# Patient Record
Sex: FEMALE | Race: WHITE | NOT HISPANIC OR LATINO | URBAN - METROPOLITAN AREA
[De-identification: names, ages, dates, MRNs, and addresses within clinical notes are randomized per-mention and may not be internally consistent; named-entity substitution may affect disease eponyms.]

---

## 2018-04-17 ENCOUNTER — EMERGENCY (EMERGENCY)
Facility: HOSPITAL | Age: 68
LOS: 1 days | Discharge: HOME | End: 2018-04-17
Attending: EMERGENCY MEDICINE | Admitting: EMERGENCY MEDICINE
Payer: MEDICARE

## 2018-04-17 VITALS
OXYGEN SATURATION: 100 % | RESPIRATION RATE: 16 BRPM | SYSTOLIC BLOOD PRESSURE: 154 MMHG | HEART RATE: 87 BPM | DIASTOLIC BLOOD PRESSURE: 80 MMHG

## 2018-04-17 DIAGNOSIS — F22 DELUSIONAL DISORDERS: ICD-10-CM

## 2018-04-17 DIAGNOSIS — R69 ILLNESS, UNSPECIFIED: ICD-10-CM

## 2018-04-17 PROCEDURE — 90792 PSYCH DIAG EVAL W/MED SRVCS: CPT

## 2018-04-17 PROCEDURE — 99283 EMERGENCY DEPT VISIT LOW MDM: CPT

## 2018-04-17 NOTE — ED BEHAVIORAL HEALTH ASSESSMENT NOTE - CASE SUMMARY
68 year old   female, disabled, non-caregiver, domiciled, PPH dx with Paranoid Schizophrenia, history of 1 past inpatient admission at [facility] 1/18, no history of suicide attempts, aggression, violence, substance abuse issues or PMH.  Patient presents to the ER brought by sons in the context of delusional statements.  Patient endorses some delusion regarding belief that old presidents live at a nearby apartment complex for many years which are chronic, but denies any intent/plan to harm them or have any relations/meet with them.  She denies any other delusions.  She otherwise has a linear, organized thought process and denies any psychotic symptoms.  She is showering, caring for herself and was able to discuss recent shopping list, meals prepared etc.  Patient denies SI/HI/SIB/intent/plan, sylvia/hypomania, depression or any other mental health issues.  Patient is future oriented and able to safety plan.  Patient was offered and refused voluntary inpatient psychiatric admission.  She is not an imminent risk to self or others and is not engaging in dangerous behavior.  Patient does not meet criteria for involuntary inpatient admission as per mental health hygiene law. Recommend discharge and advised follow up with outpatient psychiatrist.

## 2018-04-17 NOTE — ED BEHAVIORAL HEALTH ASSESSMENT NOTE - DESCRIPTION
see hpi During course of ED visit patient was calm and cooperative. Patient was not aggressive or violent and did not require PRN medications. none During course of ED visit patient was calm and cooperative. Patient was not aggressive or violent and did not require PRN medications.    Vital Signs Last 24 Hrs  T(C): --  T(F): --  HR: 87 (17 Apr 2018 13:22) (87 - 87)  BP: 154/80 (17 Apr 2018 13:22) (154/80 - 154/80)  BP(mean): --  RR: 16 (17 Apr 2018 13:22) (16 - 16)  SpO2: 100% (17 Apr 2018 13:22) (100% - 100%)

## 2018-04-17 NOTE — ED PROVIDER NOTE - OBJECTIVE STATEMENT
68F w recent onset psychosis, admitted at [facility], LA'd home but off of meds, only took 1 shot of Abilify, no improvement noted by family. Pt. NAD at time of exam, per sons, has inc. delusions, and dec. po intake from prev. Denies weight loss, no fever/chills, no recent illness, no SI/HI, calm and appropriate at time of eval.

## 2018-04-17 NOTE — ED ADULT TRIAGE NOTE - CHIEF COMPLAINT QUOTE
pt amb to triage w/ family stating presents for arm pain 2/2 washing clothes by hand, as per sons increased confusion, states  to her doctor which shes not, and shes having affair w/ Cristian Jarrett, put telephone in fridge, and believes the "crystals in the TV are watching her," recently harika'd @ [place]  d/c'd because pt was not harm to self or others, sons state "she's not in reality" calm/cooperative on presentation, sons state will remain w/ pt pt amb to triage w/ family stating presents for arm pain 2/2 washing clothes by hand, as per sons increased confusion, states  to her doctor which shes not, and shes having affair w/ Cristian Jarrett, put telephone in fridge, and believes the "crystals in the TV are watching her," recently harika'd @ [place]  d/c'd because pt was not harm to self or others, sons state "she's not in reality" calm/cooperative on presentation, sons state will remain w/ pt  Alexandr [phone]  Wilbur [phone]

## 2018-04-17 NOTE — ED BEHAVIORAL HEALTH ASSESSMENT NOTE - SUMMARY
Patient is a 68 year old  disabled non-caregiver  female currently residing in a private residence alone. PPH dx with Paranoid Schizophrenia during last inpatient admission 1/18. She has a history of 1 past inpatient admission at [facility] 1/18. She has no history of suicide attempts, aggression, violence, substance abuse issues or PMH. BIB sons for delusional statements.    Patient presents to the ER in the context of delusional statements. Patient endorses some delusion regarding belief that old presidents live at a nearby apartment complex for many years but denies any intent/plan to harm them or have any relations/meet with them. She denies any other delusions. She otherwise has a linear organized thought process and denies any psychotic symptoms. She is showering, caring for herself and was able to discuss recent shopping list, meals prepared etc.  Patient denies SI/HI/SIB/intent/plan, sylvia/hypomania, depression or any other mental health issues. Patient is future oriented and able to safety plan.  AOx4. She was offered and refused inpatient psychiatric admission. She is not an imminent risk to self or others and is not engaging in dangerous behavior.  Patient does not meet criteria for involuntary inpatient admission. Recommend discharge and given follow up at [facility] crisis clinic. Extensive safety planning performed. Patient and family agreeing verbally to return patient to ER or call 911 if symptoms worsen or patient has urges to harm self or others.

## 2018-04-17 NOTE — ED BEHAVIORAL HEALTH ASSESSMENT NOTE - RISK ASSESSMENT
Patient does not present an imminent risk to self or others as evidence by no suicidal thoughts plan/intent, no homicidal thoughts, no sylvia/hypomania, no AH/VH, no paranoia /IOR, no depression, eating/sleeping well, showering/changing clothes, shopping and preparing food for self, adequate income, supportive family, no aggression/violence, no legal issues and calm/cooperative throughout evaluation.     risk factors- delusional statement, history of inpatient admission, non compliance with recent treatment

## 2018-04-17 NOTE — ED BEHAVIORAL HEALTH NOTE - BEHAVIORAL HEALTH NOTE
Patient is a 68 year old female brought in to the emergency room by her sons.  Writer called pt's son's Alexandr and Wilbur  who provided the following information.  Patient resides alone.  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  Patient reportedly talked to self at work in the high school and has been collecting social security disability for 20 years.  Pt's son reside in [place].  Patient had her first psychiatric admission to [facility] January 2018 for two weeks. Patient was reportedly diagnosed as paranoid schizophrenia.  She received a long acting injection of Abilify, but has refused any further medication.  They did not see any improvement in patient after receiving NEGRETE, but state [place] informed them she was not a danger to self or others and patient was discharged.  She did attend an intake visit at [facility] in [place], but refused to return for regular follow ups.  Patient has no other psychiatric admissions.  Patients sons were planning to bring patient to the ER at [facility] since her discharge from [facility] stating they were not happy with the outcome of her stay at [facility].    Pt's sons are concerned because patient has a leak in her bathroom faucet she will not allow the superintendent to enter her apartment.  Patient has very little food in the refrigerator and not eating well.  Patient has her phone in the freezer and the TV is unplugged because she states it has crystals.  They state patient is not doing anything dangerous or threatening.  She will not allow a home attendant into the home.  They report she resides on the second floor and were able to hear patient screaming from downstairs when they arrived.  Patient's sons would like her admitted stating their main concern is that she doesn't have enough food in the refrigerator.  Patient does not have any agencies involved.  Pt's sons were informed patient may not require admission.  They inquired about nursing home facility which writer informed them that patient would not qualify for nursing home placement.  They inquired about assisted living facility.  Writer informed them costs associated with assisted living facilities.  Writer discussed case with evaluating psychiatric nurse practitioner and was informed patient was psychiatrically cleared.  Writer called pt's sons to inform them of discharge with recommendation for walk in clinic at [facility].  Pt's son's were in agreement with discharge and will wait for patient in the ER waiting area to transport her home.

## 2018-04-17 NOTE — ED BEHAVIORAL HEALTH ASSESSMENT NOTE - OTHER PAST PSYCHIATRIC HISTORY (INCLUDE DETAILS REGARDING ONSET, COURSE OF ILLNESS, INPATIENT/OUTPATIENT TREATMENT)
PPH dx with Paranoid Schizophrenia during last inpatient admission 1/18. She has a history of 1 past inpatient admission at [facility] 1/18. She has no history of suicide attempts.    Patient on disability x 20 years ago "talking to self"

## 2018-04-17 NOTE — ED ADULT NURSE NOTE - CHIEF COMPLAINT QUOTE
pt amb to triage w/ family stating presents for arm pain 2/2 washing clothes by hand, as per sons increased confusion, states  to her doctor which shes not, and shes having affair w/ Cristian Jarrett, put telephone in fridge, and believes the "crystals in the TV are watching her," recently harika'd @ [place]  d/c'd because pt was not harm to self or others, sons state "she's not in reality" calm/cooperative on presentation, sons state will remain w/ pt  Alexandr [phone]  Wilbur [phone]

## 2018-04-17 NOTE — ED BEHAVIORAL HEALTH ASSESSMENT NOTE - HPI (INCLUDE ILLNESS QUALITY, SEVERITY, DURATION, TIMING, CONTEXT, MODIFYING FACTORS, ASSOCIATED SIGNS AND SYMPTOMS)
Patient is a 68 year old  disabled non-caregiver  female currently residing in a private residence alone. PPH dx with Paranoid Schizophrenia during last inpatient admission 1/18. She has a history of 1 past inpatient admission at [facility] 1/18. She has no history of suicide attempts, aggression, violence, substance abuse issues or PMH. BIB sons for delusional statements.    Patient presents to the ER in the context of sons stating she is making delusional statements. Patient stated she does not know why they brought her here. She said they showed up and were insistent she go to lunch. She did not want to go because she said she was tired and her sheets were drying from laundry. They took her purse so this left her no choice but to go with them and they brought her to the ER.    Patient reports she is doing fine. She stated she eats everyday and has plenty of food in the apartment. She discussed recent shopping trip to [facility] [employer] and food she bought and said she prepares her meals everyday. She discussed what she ate today and yesterday. She stated she unplugs her TV because it does not work and the picture is fuzzy. She did make a delusional statement saying that there is a nearby apartment where past presidents live such as Sandee Pereyra and currently sometimes Cristian Farias. She stated she has believed this for years. She denies any thoughts plan/intent to meet with these individuals and denies HI. She denies SI/HI/SIB/intent/plan, depression, AH/VH, paranoia, IOR, anxiety, drug use, sylvia/hypomania or any other mental michael issues. She was AOx4. She stated she showers daily and changes her clothes. She appeared well kept with good hygiene.     She stated she got a NEGRETE medication at [facility] but did not continue treatment because she did not like the doctor at [facility].     See  note for collateral information.

## 2018-04-17 NOTE — ED BEHAVIORAL HEALTH ASSESSMENT NOTE - SUICIDE PROTECTIVE FACTORS
Identifies reasons for living/Future oriented/High spirituality/Responsibility to family and others/Supportive social network or family

## 2018-04-17 NOTE — ED BEHAVIORAL HEALTH ASSESSMENT NOTE - SAFETY PLAN DETAILS
Extensive safety planning performed. Patient and family agreeing verbally to return patient to ER or call 911 if symptoms worsen or patient has urges to harm self or others.

## 2018-04-17 NOTE — ED PROVIDER NOTE - MEDICAL DECISION MAKING DETAILS
68F c/o worsening behavior, paranoid ideation, delusions, and non-adherence to FU and medication. To ED with sons, deny fall/injury, no recent illness, no fever/chills, denies SI/HI. Medically cleared for psych eval.

## 2018-04-18 NOTE — ED POST DISCHARGE NOTE - REASON FOR FOLLOW-UP
Other Telephone follow up  request : Dr garces wants to see if patient can receive a Home care evaluation. Contacted ALEKSANDAR combs [phone] who said she would discuss with  ALEKSANDAR to see if anything can be arranged for patient .

## 2018-04-18 NOTE — PROVIDER CONTACT NOTE (OTHER) - BACKGROUND
received a call from ED  ADM AYLEEN cruz case d/c yesterday for ER. Case was seen in ED BH by ALEKSANDAR Ty

## 2019-02-21 ENCOUNTER — EMERGENCY (EMERGENCY)
Facility: HOSPITAL | Age: 69
LOS: 1 days | Discharge: HOME | End: 2019-02-21
Admitting: EMERGENCY MEDICINE
Payer: MEDICARE

## 2019-02-21 VITALS
HEART RATE: 103 BPM | SYSTOLIC BLOOD PRESSURE: 101 MMHG | TEMPERATURE: 98 F | OXYGEN SATURATION: 96 % | RESPIRATION RATE: 18 BRPM | DIASTOLIC BLOOD PRESSURE: 68 MMHG

## 2019-02-21 LAB
ALBUMIN SERPL ELPH-MCNC: 4.6 G/DL — SIGNIFICANT CHANGE UP (ref 3.3–5)
ALP SERPL-CCNC: 49 U/L — SIGNIFICANT CHANGE UP (ref 40–120)
ALT FLD-CCNC: 17 U/L — SIGNIFICANT CHANGE UP (ref 4–33)
AMPHET UR-MCNC: NEGATIVE — SIGNIFICANT CHANGE UP
ANION GAP SERPL CALC-SCNC: 30 MMO/L — HIGH (ref 7–14)
APAP SERPL-MCNC: < 15 UG/ML — LOW (ref 15–25)
APPEARANCE UR: SIGNIFICANT CHANGE UP
AST SERPL-CCNC: 38 U/L — HIGH (ref 4–32)
BACTERIA # UR AUTO: HIGH
BARBITURATES UR SCN-MCNC: NEGATIVE — SIGNIFICANT CHANGE UP
BASOPHILS # BLD AUTO: 0.04 K/UL — SIGNIFICANT CHANGE UP (ref 0–0.2)
BASOPHILS NFR BLD AUTO: 0.6 % — SIGNIFICANT CHANGE UP (ref 0–2)
BENZODIAZ UR-MCNC: NEGATIVE — SIGNIFICANT CHANGE UP
BILIRUB SERPL-MCNC: 0.6 MG/DL — SIGNIFICANT CHANGE UP (ref 0.2–1.2)
BILIRUB UR-MCNC: 0.5 — SIGNIFICANT CHANGE UP
BLOOD UR QL VISUAL: SIGNIFICANT CHANGE UP
BUN SERPL-MCNC: 28 MG/DL — HIGH (ref 7–23)
CALCIUM SERPL-MCNC: 10.4 MG/DL — SIGNIFICANT CHANGE UP (ref 8.4–10.5)
CANNABINOIDS UR-MCNC: NEGATIVE — SIGNIFICANT CHANGE UP
CHLORIDE SERPL-SCNC: 88 MMOL/L — LOW (ref 98–107)
CO2 SERPL-SCNC: 19 MMOL/L — LOW (ref 22–31)
COCAINE METAB.OTHER UR-MCNC: NEGATIVE — SIGNIFICANT CHANGE UP
COLOR SPEC: YELLOW — SIGNIFICANT CHANGE UP
CREAT SERPL-MCNC: 0.85 MG/DL — SIGNIFICANT CHANGE UP (ref 0.5–1.3)
EOSINOPHIL # BLD AUTO: 0.01 K/UL — SIGNIFICANT CHANGE UP (ref 0–0.5)
EOSINOPHIL NFR BLD AUTO: 0.1 % — SIGNIFICANT CHANGE UP (ref 0–6)
EPI CELLS # UR: SIGNIFICANT CHANGE UP
ETHANOL BLD-MCNC: < 10 MG/DL — SIGNIFICANT CHANGE UP
GLUCOSE SERPL-MCNC: 109 MG/DL — HIGH (ref 70–99)
GLUCOSE UR-MCNC: NEGATIVE — SIGNIFICANT CHANGE UP
HCT VFR BLD CALC: 47.4 % — HIGH (ref 34.5–45)
HGB BLD-MCNC: 15.5 G/DL — SIGNIFICANT CHANGE UP (ref 11.5–15.5)
IMM GRANULOCYTES NFR BLD AUTO: 0.8 % — SIGNIFICANT CHANGE UP (ref 0–1.5)
KETONES UR-MCNC: 100 — SIGNIFICANT CHANGE UP
LEUKOCYTE ESTERASE UR-ACNC: HIGH
LYMPHOCYTES # BLD AUTO: 1.57 K/UL — SIGNIFICANT CHANGE UP (ref 1–3.3)
LYMPHOCYTES # BLD AUTO: 21.8 % — SIGNIFICANT CHANGE UP (ref 13–44)
MCHC RBC-ENTMCNC: 30 PG — SIGNIFICANT CHANGE UP (ref 27–34)
MCHC RBC-ENTMCNC: 32.7 % — SIGNIFICANT CHANGE UP (ref 32–36)
MCV RBC AUTO: 91.7 FL — SIGNIFICANT CHANGE UP (ref 80–100)
METHADONE UR-MCNC: NEGATIVE — SIGNIFICANT CHANGE UP
MONOCYTES # BLD AUTO: 0.48 K/UL — SIGNIFICANT CHANGE UP (ref 0–0.9)
MONOCYTES NFR BLD AUTO: 6.7 % — SIGNIFICANT CHANGE UP (ref 2–14)
NEUTROPHILS # BLD AUTO: 5.04 K/UL — SIGNIFICANT CHANGE UP (ref 1.8–7.4)
NEUTROPHILS NFR BLD AUTO: 70 % — SIGNIFICANT CHANGE UP (ref 43–77)
NITRITE UR-MCNC: NEGATIVE — SIGNIFICANT CHANGE UP
NRBC # FLD: 0 K/UL — LOW (ref 25–125)
OPIATES UR-MCNC: NEGATIVE — SIGNIFICANT CHANGE UP
OXYCODONE UR-MCNC: NEGATIVE — SIGNIFICANT CHANGE UP
PCP UR-MCNC: NEGATIVE — SIGNIFICANT CHANGE UP
PH UR: 6.5 — SIGNIFICANT CHANGE UP (ref 5–8)
PLATELET # BLD AUTO: 265 K/UL — SIGNIFICANT CHANGE UP (ref 150–400)
PMV BLD: 10.1 FL — SIGNIFICANT CHANGE UP (ref 7–13)
POTASSIUM SERPL-MCNC: 3.8 MMOL/L — SIGNIFICANT CHANGE UP (ref 3.5–5.3)
POTASSIUM SERPL-SCNC: 3.8 MMOL/L — SIGNIFICANT CHANGE UP (ref 3.5–5.3)
PROT SERPL-MCNC: 7.9 G/DL — SIGNIFICANT CHANGE UP (ref 6–8.3)
PROT UR-MCNC: 200 — HIGH
RBC # BLD: 5.17 M/UL — SIGNIFICANT CHANGE UP (ref 3.8–5.2)
RBC # FLD: 13.7 % — SIGNIFICANT CHANGE UP (ref 10.3–14.5)
RBC CASTS # UR COMP ASSIST: SIGNIFICANT CHANGE UP (ref 0–?)
SALICYLATES SERPL-MCNC: < 5 MG/DL — LOW (ref 15–30)
SODIUM SERPL-SCNC: 137 MMOL/L — SIGNIFICANT CHANGE UP (ref 135–145)
SP GR SPEC: 1.02 — SIGNIFICANT CHANGE UP (ref 1–1.04)
TSH SERPL-MCNC: 2.89 UIU/ML — SIGNIFICANT CHANGE UP (ref 0.27–4.2)
UROBILINOGEN FLD QL: 3 — SIGNIFICANT CHANGE UP
WBC # BLD: 7.2 K/UL — SIGNIFICANT CHANGE UP (ref 3.8–10.5)
WBC # FLD AUTO: 7.2 K/UL — SIGNIFICANT CHANGE UP (ref 3.8–10.5)
WBC UR QL: SIGNIFICANT CHANGE UP (ref 0–?)

## 2019-02-21 PROCEDURE — 99283 EMERGENCY DEPT VISIT LOW MDM: CPT | Mod: 25

## 2019-02-21 PROCEDURE — 93010 ELECTROCARDIOGRAM REPORT: CPT

## 2019-02-21 RX ORDER — CEPHALEXIN 500 MG
1 CAPSULE ORAL
Qty: 14 | Refills: 0
Start: 2019-02-21 | End: 2019-02-27

## 2019-02-21 RX ORDER — CEPHALEXIN 500 MG
500 CAPSULE ORAL ONCE
Qty: 0 | Refills: 0 | Status: COMPLETED | OUTPATIENT
Start: 2019-02-21 | End: 2019-02-21

## 2019-02-21 RX ADMIN — Medication 500 MILLIGRAM(S): at 16:43

## 2019-02-21 NOTE — ED ADULT NURSE NOTE - NSIMPLEMENTINTERV_GEN_ALL_ED
Implemented All Universal Safety Interventions:  [place] to call system. Call bell, personal items and telephone within reach. Instruct patient to call for assistance. Room bathroom lighting operational. Non-slip footwear when patient is off stretcher. Physically safe environment: no spills, clutter or unnecessary equipment. Stretcher in lowest position, wheels locked, appropriate side rails in place.

## 2019-02-21 NOTE — ED PROVIDER NOTE - OBJECTIVE STATEMENT
67 y/o female BIB EMS from home for "delusions".  As per pt's attendant "she has 67 y/o female BIB EMS from home for "delusions".  As per pt's attendant Sharita "she has schizoaffective disorder, but I haven't seen her like this before".  "She was screaming and kept asking me to call her by another name, and she thinks she's  to Cristian Farias".  Sharita denies pt being physically or verbally aggressive.  PT is alert and oriented x 2, states: "I didn't want to come here, those ambulance people made me".  Pt denies SI/HI/AH/VH.  PT with no c/o pain or discomfort at this time, but asks "Can I have a cup of coffee or a bagel or a roll or something to eat please?"

## 2019-02-21 NOTE — ED ADULT NURSE REASSESSMENT NOTE - NS ED NURSE REASSESS COMMENT FT1
Pt calm & cooperative back to baseline pt denies Si/Hi/AVh presently pt d/c by NP, pt attendant  verbalizing understanding of d/c instructions pt transported back  home accompanied by attendant.
Patient presents calm and cooperative, NAD, needs met, blood work and EKG done, pt currently in [facility] chair awaiting further MD evaluation, will continue to monitor pt.

## 2019-02-21 NOTE — ED PROVIDER NOTE - CLINICAL SUMMARY MEDICAL DECISION MAKING FREE TEXT BOX
69 y/o female BIB EMS from home for "delusions". 67 y/o female BIB EMS from home for "delusions".  Labs drawn and sent.  Physical examination completed and unremarkable for any acute issues/problems requiring immediate interventions.  PT cleared for discharge.  Pt departed area with her attendant Velvet

## 2019-02-23 LAB
BACTERIA UR CULT: SIGNIFICANT CHANGE UP
SPECIMEN SOURCE: SIGNIFICANT CHANGE UP

## 2019-02-24 NOTE — ED POST DISCHARGE NOTE - RESULT SUMMARY
culture grew 3 or more types of organisms  which indicate collection contamination, consider recollection only if clinically indicated. No antibiotic listed in ED provider note or prescription writer at time of discharge. Patient with schizophrenia. Patient contact # [phone] Msg left with call back PA # and hrs.

## 2019-11-12 ENCOUNTER — EMERGENCY (EMERGENCY)
Facility: HOSPITAL | Age: 69
LOS: 1 days | Discharge: HOME | End: 2019-11-12
Attending: EMERGENCY MEDICINE
Payer: MEDICARE

## 2019-11-12 VITALS
WEIGHT: 104.94 LBS | TEMPERATURE: 98 F | HEART RATE: 86 BPM | SYSTOLIC BLOOD PRESSURE: 145 MMHG | DIASTOLIC BLOOD PRESSURE: 87 MMHG | OXYGEN SATURATION: 97 % | RESPIRATION RATE: 16 BRPM | HEIGHT: 64 IN

## 2019-11-12 VITALS
OXYGEN SATURATION: 98 % | SYSTOLIC BLOOD PRESSURE: 138 MMHG | HEART RATE: 75 BPM | TEMPERATURE: 98 F | DIASTOLIC BLOOD PRESSURE: 70 MMHG | RESPIRATION RATE: 18 BRPM

## 2019-11-12 PROCEDURE — 99283 EMERGENCY DEPT VISIT LOW MDM: CPT

## 2019-11-12 PROCEDURE — 82962 GLUCOSE BLOOD TEST: CPT

## 2019-11-12 NOTE — ED PROVIDER NOTE - CLINICAL SUMMARY MEDICAL DECISION MAKING FREE TEXT BOX
Pt is a 69y F brought in by EMS for noise complaint. Pt was calm and cooperative and A&O x3. Pt stable and safe for discharge.

## 2019-11-12 NOTE — ED ADULT TRIAGE NOTE - CHIEF COMPLAINT QUOTE
gayatri as per ems neighbor c/o that she is making noise in her apartment, patient live alone as per ems

## 2019-11-12 NOTE — ED PROVIDER NOTE - PSYCHIATRIC, MLM
Alert and oriented to person, place, time/situation. normal mood and affect. no apparent risk to self or others. Calm and cooperative.

## 2019-11-12 NOTE — ED PROVIDER NOTE - PATIENT PORTAL LINK FT
You can access the FollowMyHealth Patient Portal offered by [facility] by registering at the following website: [email or web address]. By joining [employer]’s FollowMyHealth portal, you will also be able to view your health information using other applications (apps) compatible with our system.

## 2019-11-12 NOTE — ED PROVIDER NOTE - OBJECTIVE STATEMENT
Pt is a 69y F with no significant PMHx and no significant PSHx brought in by EMS because she was making noise in her apartment. As per EMS, police was called by neighbor because she was making noise. Pt reports difficulty getting into her apartment because the lock was broken but denies making any noise. Pt denies any physical symptoms. Pt has NKDA and currently denies any additional complaints at this time.

## 2019-11-13 PROBLEM — F31.9 BIPOLAR DISORDER, UNSPECIFIED: Chronic | Status: ACTIVE | Noted: 2019-02-21

## 2019-11-13 PROBLEM — F25.9 SCHIZOAFFECTIVE DISORDER, UNSPECIFIED: Chronic | Status: ACTIVE | Noted: 2019-02-21

## 2020-09-26 NOTE — ED ADULT TRIAGE NOTE - NS_BH TRG QUESTION1_ED_ALL_ED
[facility]  Discharge Note     Pt belongings: Retrieved from room/safe, reviewed and packed to take with. Dentures: None  Vision - Corrective Lenses: Glasses  Hearing Aid: None  Jewelry: None  Body Piercings Removed: N/A  Clothing: Footwear, Jacket / coat, Pants  Were All Patient Medications Collected?: Not Applicable  Other Valuables: Cell phone, Money (Comment)(97)       Patient discharged [facility]. Sent via black and white cab. Instructed on discharge instructions, pt verbalizes understanding and signs AVS. Pt in control at time of discharge. Pt ambulates to main hospital entrance with psych staff. Rx escribed to [address] No complaints voiced at this time.         Status EXAM upon discharge:  Status and Exam  Normal: Yes  Facial Expression: Brightened  Affect: Appropriate  Level of Consciousness: Alert  Mood:Normal: No  Mood: Anxious  Motor Activity:Normal: Yes  Motor Activity: Increased  Interview Behavior: Cooperative  Preception: [place] to Person, [place] to Time, [place] to Place, [place] to Situation  Attention:Normal: No  Attention: Distractible  Thought Processes: Circumstantial  Thought Content:Normal: Yes  Thought Content: Preoccupations  Hallucinations: None  Delusions: No  Memory:Normal: No  Memory: Poor Recent  Insight and Judgment: No  Insight and Judgment: Poor Judgment  Present Suicidal Ideation: No  Present Homicidal Ideation: No    Zac Marcos LPN No

## 2020-12-01 NOTE — ED PROVIDER NOTE - CARDIAC, MLM
----- Message from Mark Soto MD sent at 11/30/2020  2:07 PM CST -----  Notify pt, normal/reassuring    See phone message from 11/27/20, pt was notified of results and recommendations already. Normal rate, regular rhythm.  Heart sounds S1, S2.  No murmurs, rubs or gallops.

## 2021-06-01 ENCOUNTER — INPATIENT (INPATIENT)
Facility: HOSPITAL | Age: 71
LOS: 3 days | Discharge: HOME | DRG: 481 | End: 2021-06-05
Attending: ORTHOPAEDIC SURGERY | Admitting: ORTHOPAEDIC SURGERY
Payer: MEDICARE

## 2021-06-01 VITALS
SYSTOLIC BLOOD PRESSURE: 138 MMHG | HEART RATE: 117 BPM | OXYGEN SATURATION: 98 % | HEIGHT: 64 IN | RESPIRATION RATE: 16 BRPM | DIASTOLIC BLOOD PRESSURE: 72 MMHG | TEMPERATURE: 98 F

## 2021-06-01 LAB
ALBUMIN SERPL ELPH-MCNC: 3.7 G/DL — SIGNIFICANT CHANGE UP (ref 3.5–5)
ALP SERPL-CCNC: 96 U/L — SIGNIFICANT CHANGE UP (ref 40–120)
ALT FLD-CCNC: 31 U/L DA — SIGNIFICANT CHANGE UP (ref 10–60)
ANION GAP SERPL CALC-SCNC: 11 MMOL/L — SIGNIFICANT CHANGE UP (ref 5–17)
AST SERPL-CCNC: 47 U/L — HIGH (ref 10–40)
BASOPHILS # BLD AUTO: 0.02 K/UL — SIGNIFICANT CHANGE UP (ref 0–0.2)
BASOPHILS NFR BLD AUTO: 0.1 % — SIGNIFICANT CHANGE UP (ref 0–2)
BILIRUB SERPL-MCNC: 0.7 MG/DL — SIGNIFICANT CHANGE UP (ref 0.2–1.2)
BUN SERPL-MCNC: 26 MG/DL — HIGH (ref 7–18)
CALCIUM SERPL-MCNC: 9.1 MG/DL — SIGNIFICANT CHANGE UP (ref 8.4–10.5)
CHLORIDE SERPL-SCNC: 105 MMOL/L — SIGNIFICANT CHANGE UP (ref 96–108)
CO2 SERPL-SCNC: 22 MMOL/L — SIGNIFICANT CHANGE UP (ref 22–31)
CREAT SERPL-MCNC: 0.71 MG/DL — SIGNIFICANT CHANGE UP (ref 0.5–1.3)
EOSINOPHIL # BLD AUTO: 0 K/UL — SIGNIFICANT CHANGE UP (ref 0–0.5)
EOSINOPHIL NFR BLD AUTO: 0 % — SIGNIFICANT CHANGE UP (ref 0–6)
GLUCOSE SERPL-MCNC: 127 MG/DL — HIGH (ref 70–99)
HCT VFR BLD CALC: 39.1 % — SIGNIFICANT CHANGE UP (ref 34.5–45)
HGB BLD-MCNC: 13 G/DL — SIGNIFICANT CHANGE UP (ref 11.5–15.5)
IMM GRANULOCYTES NFR BLD AUTO: 0.5 % — SIGNIFICANT CHANGE UP (ref 0–1.5)
LYMPHOCYTES # BLD AUTO: 1.11 K/UL — SIGNIFICANT CHANGE UP (ref 1–3.3)
LYMPHOCYTES # BLD AUTO: 7.7 % — LOW (ref 13–44)
MCHC RBC-ENTMCNC: 30 PG — SIGNIFICANT CHANGE UP (ref 27–34)
MCHC RBC-ENTMCNC: 33.2 GM/DL — SIGNIFICANT CHANGE UP (ref 32–36)
MCV RBC AUTO: 90.3 FL — SIGNIFICANT CHANGE UP (ref 80–100)
MONOCYTES # BLD AUTO: 0.97 K/UL — HIGH (ref 0–0.9)
MONOCYTES NFR BLD AUTO: 6.7 % — SIGNIFICANT CHANGE UP (ref 2–14)
NEUTROPHILS # BLD AUTO: 12.32 K/UL — HIGH (ref 1.8–7.4)
NEUTROPHILS NFR BLD AUTO: 85 % — HIGH (ref 43–77)
NRBC # BLD: 0 /100 WBCS — SIGNIFICANT CHANGE UP (ref 0–0)
PLATELET # BLD AUTO: 348 K/UL — SIGNIFICANT CHANGE UP (ref 150–400)
POTASSIUM SERPL-MCNC: 3.9 MMOL/L — SIGNIFICANT CHANGE UP (ref 3.5–5.3)
POTASSIUM SERPL-SCNC: 3.9 MMOL/L — SIGNIFICANT CHANGE UP (ref 3.5–5.3)
PROT SERPL-MCNC: 8.1 G/DL — SIGNIFICANT CHANGE UP (ref 6–8.3)
RBC # BLD: 4.33 M/UL — SIGNIFICANT CHANGE UP (ref 3.8–5.2)
RBC # FLD: 12.9 % — SIGNIFICANT CHANGE UP (ref 10.3–14.5)
SODIUM SERPL-SCNC: 138 MMOL/L — SIGNIFICANT CHANGE UP (ref 135–145)
WBC # BLD: 14.49 K/UL — HIGH (ref 3.8–10.5)
WBC # FLD AUTO: 14.49 K/UL — HIGH (ref 3.8–10.5)

## 2021-06-01 PROCEDURE — 99285 EMERGENCY DEPT VISIT HI MDM: CPT

## 2021-06-01 RX ORDER — ACETAMINOPHEN 500 MG
650 TABLET ORAL ONCE
Refills: 0 | Status: COMPLETED | OUTPATIENT
Start: 2021-06-01 | End: 2021-06-01

## 2021-06-01 RX ORDER — SODIUM CHLORIDE 9 MG/ML
1000 INJECTION INTRAMUSCULAR; INTRAVENOUS; SUBCUTANEOUS ONCE
Refills: 0 | Status: COMPLETED | OUTPATIENT
Start: 2021-06-01 | End: 2021-06-01

## 2021-06-01 RX ORDER — MORPHINE SULFATE 50 MG/1
2 CAPSULE, EXTENDED RELEASE ORAL ONCE
Refills: 0 | Status: DISCONTINUED | OUTPATIENT
Start: 2021-06-01 | End: 2021-06-01

## 2021-06-01 RX ADMIN — SODIUM CHLORIDE 1000 MILLILITER(S): 9 INJECTION INTRAMUSCULAR; INTRAVENOUS; SUBCUTANEOUS at 23:34

## 2021-06-01 RX ADMIN — Medication 650 MILLIGRAM(S): at 23:33

## 2021-06-01 NOTE — ED ADULT NURSE NOTE - OBJECTIVE STATEMENT
pt BIBA due to fall. Pt states " I was picking up a lavender shirt, I sat down on the floor and then suddenly I couldn't get up and my left leg hurts". Denies head trauma, LOC, chest pain, dizziness and headache.

## 2021-06-01 NOTE — ED ADULT NURSE NOTE - NSIMPLEMENTINTERV_GEN_ALL_ED
Implemented All Fall Risk Interventions:  [place] to call system. Call bell, personal items and telephone within reach. Instruct patient to call for assistance. Room bathroom lighting operational. Non-slip footwear when patient is off stretcher. Physically safe environment: no spills, clutter or unnecessary equipment. Stretcher in lowest position, wheels locked, appropriate side rails in place. Provide visual cue, wrist band, yellow gown, etc. Monitor gait and stability. Monitor for mental status changes and reorient to person, place, and time. Review medications for side effects contributing to fall risk. Reinforce activity limits and safety measures with patient and family.

## 2021-06-01 NOTE — ED ADULT TRIAGE NOTE - NS ED NURSE AMBULANCES
11/08/19 1100   Activity/Group Checklist   Group   (recovery group)   Attendance Attended   Attendance Duration (min) 46-60   Interactions Disorganized interaction   Affect/Mood Blunted/flat  (unkempt)   Goals Achieved Able to listen to others; Able to engage in interactions; Able to self-disclose; Identified distorted thoughts/beliefs [facility]

## 2021-06-01 NOTE — ED ADULT NURSE NOTE - CHIEF COMPLAINT QUOTE
I lost balance and fell on my left side at 4 pm today, I did not passed out , I have left sided leg pain , pt has an altered mental status as per ems , fs was 107 by ems

## 2021-06-01 NOTE — ED ADULT TRIAGE NOTE - CHIEF COMPLAINT QUOTE
I lost balance and fell on my left side at 4 pm today, I did not passed out , I have left sided leg pain , pt has a altered mental status as per ems , fs was 107 by ems I lost balance and fell on my left side at 4 pm today, I did not passed out , I have left sided leg pain , pt has an altered mental status as per ems , fs was 107 by ems

## 2021-06-02 ENCOUNTER — TRANSCRIPTION ENCOUNTER (OUTPATIENT)
Age: 71
End: 2021-06-02

## 2021-06-02 DIAGNOSIS — M62.82 RHABDOMYOLYSIS: ICD-10-CM

## 2021-06-02 DIAGNOSIS — R73.9 HYPERGLYCEMIA, UNSPECIFIED: ICD-10-CM

## 2021-06-02 DIAGNOSIS — F25.9 SCHIZOAFFECTIVE DISORDER, UNSPECIFIED: ICD-10-CM

## 2021-06-02 DIAGNOSIS — S72.002A FRACTURE OF UNSPECIFIED PART OF NECK OF LEFT FEMUR, INITIAL ENCOUNTER FOR CLOSED FRACTURE: ICD-10-CM

## 2021-06-02 DIAGNOSIS — Z29.9 ENCOUNTER FOR PROPHYLACTIC MEASURES, UNSPECIFIED: ICD-10-CM

## 2021-06-02 DIAGNOSIS — M25.552 PAIN IN LEFT HIP: ICD-10-CM

## 2021-06-02 DIAGNOSIS — N39.0 URINARY TRACT INFECTION, SITE NOT SPECIFIED: ICD-10-CM

## 2021-06-02 LAB
ABO RH CONFIRMATION: SIGNIFICANT CHANGE UP
APPEARANCE UR: CLEAR — SIGNIFICANT CHANGE UP
BILIRUB UR-MCNC: NEGATIVE — SIGNIFICANT CHANGE UP
COLOR SPEC: YELLOW — SIGNIFICANT CHANGE UP
DIFF PNL FLD: ABNORMAL
GLUCOSE UR QL: NEGATIVE — SIGNIFICANT CHANGE UP
KETONES UR-MCNC: ABNORMAL
LEUKOCYTE ESTERASE UR-ACNC: ABNORMAL
NITRITE UR-MCNC: NEGATIVE — SIGNIFICANT CHANGE UP
PH UR: 5 — SIGNIFICANT CHANGE UP (ref 5–8)
PROT UR-MCNC: 30 MG/DL
SARS-COV-2 RNA SPEC QL NAA+PROBE: SIGNIFICANT CHANGE UP
SP GR SPEC: 1.02 — SIGNIFICANT CHANGE UP (ref 1.01–1.02)
UROBILINOGEN FLD QL: NEGATIVE — SIGNIFICANT CHANGE UP

## 2021-06-02 PROCEDURE — 73502 X-RAY EXAM HIP UNI 2-3 VIEWS: CPT | Mod: 26,LT

## 2021-06-02 PROCEDURE — 70450 CT HEAD/BRAIN W/O DYE: CPT | Mod: 26,MA

## 2021-06-02 PROCEDURE — 12345: CPT | Mod: NC

## 2021-06-02 PROCEDURE — 99222 1ST HOSP IP/OBS MODERATE 55: CPT

## 2021-06-02 PROCEDURE — 72131 CT LUMBAR SPINE W/O DYE: CPT | Mod: 26,MA

## 2021-06-02 PROCEDURE — 99223 1ST HOSP IP/OBS HIGH 75: CPT

## 2021-06-02 PROCEDURE — 71045 X-RAY EXAM CHEST 1 VIEW: CPT | Mod: 26

## 2021-06-02 PROCEDURE — 72192 CT PELVIS W/O DYE: CPT | Mod: 26,MA

## 2021-06-02 PROCEDURE — 73552 X-RAY EXAM OF FEMUR 2/>: CPT | Mod: 26,LT

## 2021-06-02 PROCEDURE — 99222 1ST HOSP IP/OBS MODERATE 55: CPT | Mod: 57

## 2021-06-02 RX ORDER — ATORVASTATIN CALCIUM 80 MG/1
1 TABLET, FILM COATED ORAL
Qty: 0 | Refills: 0 | DISCHARGE

## 2021-06-02 RX ORDER — CHLORHEXIDINE GLUCONATE 213 G/1000ML
1 SOLUTION TOPICAL EVERY 12 HOURS
Refills: 0 | Status: COMPLETED | OUTPATIENT
Start: 2021-06-02 | End: 2021-06-03

## 2021-06-02 RX ORDER — CEFTRIAXONE 500 MG/1
1000 INJECTION, POWDER, FOR SOLUTION INTRAMUSCULAR; INTRAVENOUS ONCE
Refills: 0 | Status: COMPLETED | OUTPATIENT
Start: 2021-06-02 | End: 2021-06-02

## 2021-06-02 RX ORDER — ALENDRONATE SODIUM 70 MG/1
1 TABLET ORAL
Qty: 0 | Refills: 0 | DISCHARGE

## 2021-06-02 RX ORDER — ATORVASTATIN CALCIUM 80 MG/1
20 TABLET, FILM COATED ORAL AT BEDTIME
Refills: 0 | Status: DISCONTINUED | OUTPATIENT
Start: 2021-06-02 | End: 2021-06-02

## 2021-06-02 RX ORDER — LISINOPRIL 2.5 MG/1
2.5 TABLET ORAL DAILY
Refills: 0 | Status: DISCONTINUED | OUTPATIENT
Start: 2021-06-02 | End: 2021-06-02

## 2021-06-02 RX ORDER — ESCITALOPRAM OXALATE 10 MG/1
10 TABLET, FILM COATED ORAL DAILY
Refills: 0 | Status: DISCONTINUED | OUTPATIENT
Start: 2021-06-02 | End: 2021-06-02

## 2021-06-02 RX ORDER — OXYCODONE AND ACETAMINOPHEN 5; 325 MG/1; MG/1
1 TABLET ORAL EVERY 4 HOURS
Refills: 0 | Status: DISCONTINUED | OUTPATIENT
Start: 2021-06-02 | End: 2021-06-03

## 2021-06-02 RX ORDER — ALBUTEROL 90 UG/1
1 AEROSOL, METERED ORAL
Qty: 0 | Refills: 0 | DISCHARGE

## 2021-06-02 RX ORDER — TRAMADOL HYDROCHLORIDE 50 MG/1
50 TABLET ORAL EVERY 12 HOURS
Refills: 0 | Status: DISCONTINUED | OUTPATIENT
Start: 2021-06-02 | End: 2021-06-02

## 2021-06-02 RX ORDER — POVIDONE-IODINE 5 %
1 AEROSOL (ML) TOPICAL ONCE
Refills: 0 | Status: DISCONTINUED | OUTPATIENT
Start: 2021-06-02 | End: 2021-06-03

## 2021-06-02 RX ORDER — MORPHINE SULFATE 50 MG/1
4 CAPSULE, EXTENDED RELEASE ORAL ONCE
Refills: 0 | Status: DISCONTINUED | OUTPATIENT
Start: 2021-06-02 | End: 2021-06-02

## 2021-06-02 RX ORDER — ALPRAZOLAM 0.25 MG
0.25 TABLET ORAL EVERY 12 HOURS
Refills: 0 | Status: DISCONTINUED | OUTPATIENT
Start: 2021-06-02 | End: 2021-06-02

## 2021-06-02 RX ORDER — ACETAMINOPHEN 500 MG
650 TABLET ORAL EVERY 6 HOURS
Refills: 0 | Status: DISCONTINUED | OUTPATIENT
Start: 2021-06-02 | End: 2021-06-03

## 2021-06-02 RX ORDER — ESCITALOPRAM OXALATE 10 MG/1
1 TABLET, FILM COATED ORAL
Qty: 0 | Refills: 0 | DISCHARGE

## 2021-06-02 RX ORDER — ACETAMINOPHEN 500 MG
650 TABLET ORAL EVERY 6 HOURS
Refills: 0 | Status: DISCONTINUED | OUTPATIENT
Start: 2021-06-02 | End: 2021-06-02

## 2021-06-02 RX ORDER — CEFTRIAXONE 500 MG/1
1000 INJECTION, POWDER, FOR SOLUTION INTRAMUSCULAR; INTRAVENOUS EVERY 24 HOURS
Refills: 0 | Status: DISCONTINUED | OUTPATIENT
Start: 2021-06-02 | End: 2021-06-03

## 2021-06-02 RX ORDER — ALBUTEROL 90 UG/1
2 AEROSOL, METERED ORAL EVERY 6 HOURS
Refills: 0 | Status: DISCONTINUED | OUTPATIENT
Start: 2021-06-02 | End: 2021-06-02

## 2021-06-02 RX ORDER — SODIUM CHLORIDE 9 MG/ML
1000 INJECTION INTRAMUSCULAR; INTRAVENOUS; SUBCUTANEOUS
Refills: 0 | Status: DISCONTINUED | OUTPATIENT
Start: 2021-06-02 | End: 2021-06-05

## 2021-06-02 RX ORDER — SENNA PLUS 8.6 MG/1
2 TABLET ORAL AT BEDTIME
Refills: 0 | Status: DISCONTINUED | OUTPATIENT
Start: 2021-06-02 | End: 2021-06-03

## 2021-06-02 RX ORDER — HEPARIN SODIUM 5000 [USP'U]/ML
5000 INJECTION INTRAVENOUS; SUBCUTANEOUS ONCE
Refills: 0 | Status: COMPLETED | OUTPATIENT
Start: 2021-06-02 | End: 2021-06-02

## 2021-06-02 RX ORDER — LISINOPRIL 2.5 MG/1
1 TABLET ORAL
Qty: 0 | Refills: 0 | DISCHARGE

## 2021-06-02 RX ORDER — MORPHINE SULFATE 50 MG/1
2 CAPSULE, EXTENDED RELEASE ORAL EVERY 4 HOURS
Refills: 0 | Status: DISCONTINUED | OUTPATIENT
Start: 2021-06-02 | End: 2021-06-03

## 2021-06-02 RX ADMIN — Medication 650 MILLIGRAM(S): at 01:09

## 2021-06-02 RX ADMIN — MORPHINE SULFATE 2 MILLIGRAM(S): 50 CAPSULE, EXTENDED RELEASE ORAL at 01:03

## 2021-06-02 RX ADMIN — CHLORHEXIDINE GLUCONATE 1 APPLICATION(S): 213 SOLUTION TOPICAL at 17:00

## 2021-06-02 RX ADMIN — Medication 650 MILLIGRAM(S): at 23:56

## 2021-06-02 RX ADMIN — SODIUM CHLORIDE 1000 MILLILITER(S): 9 INJECTION INTRAMUSCULAR; INTRAVENOUS; SUBCUTANEOUS at 01:09

## 2021-06-02 RX ADMIN — SENNA PLUS 2 TABLET(S): 8.6 TABLET ORAL at 22:50

## 2021-06-02 RX ADMIN — MORPHINE SULFATE 4 MILLIGRAM(S): 50 CAPSULE, EXTENDED RELEASE ORAL at 06:19

## 2021-06-02 RX ADMIN — MORPHINE SULFATE 2 MILLIGRAM(S): 50 CAPSULE, EXTENDED RELEASE ORAL at 01:33

## 2021-06-02 RX ADMIN — MORPHINE SULFATE 4 MILLIGRAM(S): 50 CAPSULE, EXTENDED RELEASE ORAL at 08:41

## 2021-06-02 RX ADMIN — Medication 650 MILLIGRAM(S): at 22:39

## 2021-06-02 RX ADMIN — CEFTRIAXONE 1000 MILLIGRAM(S): 500 INJECTION, POWDER, FOR SOLUTION INTRAMUSCULAR; INTRAVENOUS at 04:46

## 2021-06-02 RX ADMIN — SODIUM CHLORIDE 125 MILLILITER(S): 9 INJECTION INTRAMUSCULAR; INTRAVENOUS; SUBCUTANEOUS at 06:20

## 2021-06-02 RX ADMIN — HEPARIN SODIUM 5000 UNIT(S): 5000 INJECTION INTRAVENOUS; SUBCUTANEOUS at 18:06

## 2021-06-02 RX ADMIN — CEFTRIAXONE 100 MILLIGRAM(S): 500 INJECTION, POWDER, FOR SOLUTION INTRAMUSCULAR; INTRAVENOUS at 04:46

## 2021-06-02 NOTE — CONSULT NOTE ADULT - ASSESSMENT
clinical application.   Patient Search   Multi-Patient Search   MME Calculator   Reports   Drug List   Designation   My NIECY #  Data Detail Level: Printer-Friendly View Extended View  Confidential Drug Utilization Report  Search Terms: lynnette sawant, 1950Search Date: 06/02/2021 12:07:40 PM  The Drug Utilization Report below displays all of the controlled substance prescriptions, if any, that your patient has filled in the last twelve months. The information displayed on this report is compiled from pharmacy submissions to the Department, and accurately reflects the information as submitted by the pharmacies.    This report was requested by: Melanie Barrett | Reference #: [id number]    There are no results for the search terms that you entered.

## 2021-06-02 NOTE — ED PROVIDER NOTE - OBJECTIVE STATEMENT
70 y/o female h/o schizoaffective disorder, coming in after fall. Reports at 4pm today she was in her bedroom taking off her shirt when she lost balance and fell backwards. Denies head trauma or LOC. Reports she was unable to get up on her own thus she laid on the floor until tenants found her tonight who called EMS. She states in the meantime she just fell asleep on the floor. Currently reporting L leg/hip pain.

## 2021-06-02 NOTE — CONSULT NOTE ADULT - ASSESSMENT
70 y/o female admitted to medical service  L hip fx  [facility]  clearance/optimization as per medical service  pain control  further mgmt L hip fx as per ortho pending clearance

## 2021-06-02 NOTE — PROGRESS NOTE ADULT - ASSESSMENT
70 y/o female from home lives alone with PMH schizoaffective disorder (paranoid schizophrenia) and no other known comorbidities (does not follow with a PCP), BIBEMS after having an episode of mechanical fall, coming in after fall. admitted for intertrochanteric fracture of the left femoral neck. ([facility]).  Pt. with positive UA was started on Ceftriaxone, UCx testing.  Pt. seen and evaluated at bedside, noted confused with no behavioral disturbances, denies pain at this time, unable to recall fall incidence.  Pt. followed by ortho,  72 y/o female from home lives alone with PMH schizoaffective disorder (paranoid schizophrenia) and no other known comorbidities (does not follow with a PCP), BIBEMS after having an episode of mechanical fall, coming in after fall. admitted for intertrochanteric fracture of the left femoral neck. ([facility]).  Pt. with positive UA was started on Ceftriaxone, UCx testing.  Pt. seen and evaluated at bedside, noted confused with no behavioral disturbances, denies pain at this time, unable to recall fall incidence.  Pt. followed by ortho, NPO with IVF for now for possible surgical intervention. 70 y/o female from home lives alone with PMH schizoaffective disorder (paranoid schizophrenia) and no other known comorbidities (does not follow with a PCP), BIBEMS after having an episode of mechanical fall, coming in after fall. admitted for intertrochanteric fracture of the left femoral neck. ([facility]).  Pt. with positive UA was started on Ceftriaxone, UCx testing.  Pt. seen and evaluated at bedside, noted confused with no behavioral disturbances, denies pain at this time, unable to recall fall incidence.  Pt. with psych Hx on no current medications.  As per pt.'s son request psych Dr. Tompkins consulted for medical management.  Pt. followed by ortho, NPO with IVF for now for possible surgical intervention.

## 2021-06-02 NOTE — ED PROVIDER NOTE - CLINICAL SUMMARY MEDICAL DECISION MAKING FREE TEXT BOX
70 y/o female presents after mechanical fall at home. Will obtain EKG, labs, L hip x-ray, and CT head and C-spine. High suspicion for hip fracture. Will place ceballos catheter. Will likely admit for further mange ment. 70 y/o female presents after mechanical fall at home. Will obtain EKG, labs, L hip x-ray, and CT head and C-spine. High suspicion for hip fracture. Will place ceballos catheter. Will likely admit for further management.    left hip XR shows hip fracture  ekg nonischemic, labs show wbc 14K, cmp unremarkable, UA cw UTI-given ceftriaxone  Ct head unremarkable, CT L spine shows Age indeterminant moderate L1 compression deformity. Please correlate for point tenderness.  CT pelvis shows  Impacted intertrochanteric fracture of the left femoral neck  Discussed above with patient. patient stable for admission to [facility] service.

## 2021-06-02 NOTE — PROGRESS NOTE ADULT - SUBJECTIVE AND OBJECTIVE BOX
NP Note discussed with  Primary Attending    Patient is a 71y old  Female who presents with a chief complaint of intertrochanteric fracture/ [facility] (2021 11:05)      INTERVAL HPI/OVERNIGHT EVENTS: no new complaints    MEDICATIONS  (STANDING):  cefTRIAXone   IVPB 1000 milliGRAM(s) IV Intermittent every 24 hours  chlorhexidine 2% Cloths 1 Application(s) Topical every 12 hours  povidone iodine 5% Nasal Swab 1 Application(s) Both Nostrils once  sodium chloride 0.9%. 1000 milliLiter(s) (125 mL/Hr) IV Continuous <Continuous>    MEDICATIONS  (PRN):  acetaminophen   Tablet .. 650 milliGRAM(s) Oral every 6 hours PRN Mild Pain (1 - 3)  morphine  - Injectable 2 milliGRAM(s) IV Push every 4 hours PRN Severe Pain (7 - 10)  oxycodone    5 mG/acetaminophen 325 mG 1 Tablet(s) Oral every 4 hours PRN Moderate Pain (4 - 6)      __________________________________________________  REVIEW OF SYSTEMS:    CONSTITUTIONAL: No fever,   EYES: no acute visual disturbances  NECK: No pain or stiffness  RESPIRATORY: No cough; No shortness of breath  CARDIOVASCULAR: No chest pain, no palpitations  GASTROINTESTINAL: No pain. No nausea or vomiting; No diarrhea   NEUROLOGICAL: No headache or numbness, no tremors  MUSCULOSKELETAL: No joint pain, no muscle pain  GENITOURINARY: no dysuria, no frequency, no hesitancy  PSYCHIATRY: no depression , no anxiety  ALL OTHER  ROS negative        Vital Signs Last 24 Hrs  T(C): 36.9 (2021 12:59), Max: 37.3 (2021 10:24)  T(F): 98.5 (2021 12:59), Max: 99.1 (2021 10:24)  HR: 90 (2021 13:53) (88 - 117)  BP: 119/62 (2021 13:53) (106/65 - 144/84)  BP(mean): --  RR: 19 (2021 13:53) (16 - 20)  SpO2: 96% (2021 13:53) (93% - 99%)    ________________________________________________  PHYSICAL EXAM: well built  GENERAL: NAD  HEENT: Normocephalic;  conjunctivae and sclerae clear; moist mucous membranes;   NECK : supple  CHEST/LUNG: Clear to auscultation bilaterally with good air entry   HEART: S1 S2  regular; no murmurs, gallops or rubs  ABDOMEN: Soft, Nontender, Nondistended; Bowel sounds present  EXTREMITIES: no cyanosis; no edema; no calf tenderness  SKIN: warm and dry; no rash  NERVOUS SYSTEM:  Awake and alert; Oriented  to place, person and time ; no new deficits    _________________________________________________  LABS:                        13.0   14.49 )-----------( 348      ( 2021 23:21 )             39.1     06-    138  |  105  |  26<H>  ----------------------------<  127<H>  3.9   |  22  |  0.71    Ca    9.1      2021 23:21    TPro  8.1  /  Alb  3.7  /  TBili  0.7  /  DBili  x   /  AST  47<H>  /  ALT  31  /  AlkPhos  96  06-01      Urinalysis Basic - ( 2021 04:13 )    Color: Yellow / Appearance: Clear / S.025 / pH: x  Gluc: x / Ketone: Trace  / Bili: Negative / Urobili: Negative   Blood: x / Protein: 30 mg/dL / Nitrite: Negative   Leuk Esterase: Moderate / RBC: 2-5 /HPF / WBC 11-25 /HPF   Sq Epi: x / Non Sq Epi: Few /HPF / Bacteria: Moderate /HPF      CAPILLARY BLOOD GLUCOSE            RADIOLOGY & ADDITIONAL TESTS:    Imaging Personally Reviewed:  YES/NO    Consultant(s) Notes Reviewed:   YES/ No    Care Discussed with Consultants :     Plan of care was discussed with patient and /or primary care giver; all questions and concerns were addressed and care was aligned with patient's wishes.     NP Note discussed with  Primary Attending    Patient is a 71y old  Female who presents with a chief complaint of intertrochanteric fracture/ [facility] (2021 11:05)      INTERVAL HPI/OVERNIGHT EVENTS: no new complaints    MEDICATIONS  (STANDING):  cefTRIAXone   IVPB 1000 milliGRAM(s) IV Intermittent every 24 hours  chlorhexidine 2% Cloths 1 Application(s) Topical every 12 hours  povidone iodine 5% Nasal Swab 1 Application(s) Both Nostrils once  sodium chloride 0.9%. 1000 milliLiter(s) (125 mL/Hr) IV Continuous <Continuous>    MEDICATIONS  (PRN):  acetaminophen   Tablet .. 650 milliGRAM(s) Oral every 6 hours PRN Mild Pain (1 - 3)  morphine  - Injectable 2 milliGRAM(s) IV Push every 4 hours PRN Severe Pain (7 - 10)  oxycodone    5 mG/acetaminophen 325 mG 1 Tablet(s) Oral every 4 hours PRN Moderate Pain (4 - 6)      __________________________________________________  REVIEW OF SYSTEMS:    CONSTITUTIONAL: No fever,   EYES: no acute visual disturbances  NECK: No pain or stiffness  RESPIRATORY: No cough; No shortness of breath  CARDIOVASCULAR: No chest pain, no palpitations  GASTROINTESTINAL: No pain. No nausea or vomiting; No diarrhea   NEUROLOGICAL: No headache or numbness, no tremors  MUSCULOSKELETAL: No joint pain, no muscle pain  GENITOURINARY: no dysuria, no frequency, no hesitancy  PSYCHIATRY: no depression , no anxiety  ALL OTHER  ROS negative        Vital Signs Last 24 Hrs  T(C): 36.9 (2021 12:59), Max: 37.3 (2021 10:24)  T(F): 98.5 (2021 12:59), Max: 99.1 (2021 10:24)  HR: 90 (2021 13:53) (88 - 117)  BP: 119/62 (2021 13:53) (106/65 - 144/84)  BP(mean): --  RR: 19 (2021 13:53) (16 - 20)  SpO2: 96% (2021 13:53) (93% - 99%)    ________________________________________________  PHYSICAL EXAM: well built  GENERAL: NAD  HEENT: Normocephalic;  conjunctivae and sclerae clear; moist mucous membranes;   NECK : supple  CHEST/LUNG: Clear to auscultation bilaterally with good air entry   HEART: S1 S2  regular; no murmurs, gallops or rubs  ABDOMEN: Soft, Nontender, Nondistended; Bowel sounds present  EXTREMITIES: no cyanosis; no edema; no calf tenderness  SKIN: warm and dry; no rash  NERVOUS SYSTEM:  Awake and alert; Oriented  to place, person and time ; no new deficits    _________________________________________________  LABS:                        13.0   14.49 )-----------( 348      ( 2021 23:21 )             39.1     06-    138  |  105  |  26<H>  ----------------------------<  127<H>  3.9   |  22  |  0.71    Ca    9.1      2021 23:21    TPro  8.1  /  Alb  3.7  /  TBili  0.7  /  DBili  x   /  AST  47<H>  /  ALT  31  /  AlkPhos  96  06-      Urinalysis Basic - ( 2021 04:13 )    Color: Yellow / Appearance: Clear / S.025 / pH: x  Gluc: x / Ketone: Trace  / Bili: Negative / Urobili: Negative   Blood: x / Protein: 30 mg/dL / Nitrite: Negative   Leuk Esterase: Moderate / RBC: 2-5 /HPF / WBC 11-25 /HPF   Sq Epi: x / Non Sq Epi: Few /HPF / Bacteria: Moderate /HPF      CAPILLARY BLOOD GLUCOSE    RADIOLOGY & ADDITIONAL TESTS:    CT Pelvis Bony Only No Cont (21 @ 02:34) >  EXAM:  CT PELVIS BONY ONLY                            PROCEDURE DATE:  2021          INTERPRETATION:  Indication:   Trauma, left hip pain    Technique: Helical CT of the bony pelvis obtained without contrast, and multiplanar and 3-D reformatssubmitted.  CT dose lowering techniques were used, to include: automated exposure control, adjustment for patient size, and or use of iterative reconstruction.    Comparison: Radiographs from the same day    Findings:  Acute, impacted intertrochanteric fracture of the left femoral neck. The pubic rami, sacrum and coccyx are intact. Degenerative changes in the imaged lower lumbar spine, most prominent at L4-5 where there is grade 1 anterolisthesis and uncovering of the disc with at least moderate central stenosis and mild to moderate bilateral foraminal stenosis.    Hemorrhage surrounds the left femoral neck fracture. No hip joint effusion. No subcutaneous hematoma. No intrapelvic hemorrhage. Diverticulosis.    Impression:  Impacted intertrochanteric fracture of the left femoral neck    < end of copied text >    CT Lumbar Spine No Cont (21 @ 02:33) >  EXAM:  CT LUMBAR SPINE                            PROCEDURE DATE:  2021          INTERPRETATION:  Clinical indication: Back pain status post fall    COMPARISON: None    TECHNIQUE: Noncontrast axial CT images of the lumbar spine were acquired.Coronal and sagittal reconstructions are provided.    FINDINGS:    Lumbar lordosis is maintained. Mild lumbar levoscoliosis.    9 mm degenerative anterolisthesis of L4 on L5 noted. There is moderate compression deformity of L1, age indeterminate.    Multilevel degenerative changes of the spine are noted evidenced by endplate lipping, disc space narrowing, and facet arthropathy. Multilevel ligamentum flavum hypertrophy noted.    Mild broad-based disc bulge at L1-2, L2-3 and L5-S1 with moderate disc bulge at L3-4. Uncovered disc at L4-5.    No lytic or blastic abnormalities.    Intra-abdominal structures notable for a markedly distended urinary bladder and atherosclerotic calcifications.    No paraspinal hematoma.    IMPRESSION:    Age indeterminant moderate L1 compression deformity. Please correlate for point tenderness.    < end of copied text >      CT Head No Cont (21 @ 02:33) >  EXAM:  CT BRAIN                            PROCEDURE DATE:  2021          INTERPRETATION:  CLINICAL INFORMATION:  fall/trauma    TECHNIQUE:  Axial CT images were acquired through the head.  Intravenous contrast: None  Two-dimensional reformats were generated.    COMPARISON STUDY: None    FINDINGS:  The examination is somewhat limited by patient motion artifact.    There is enlargement of the ventricular system somewhat out of proportion to cortical sulcal prominence, which may be seen with normal pressure hydrocephalus.    Patchy periventricular and subcortical white matter hypodensities are nonspecific, although likely on the basis of moderate chronic small vessel ischemic disease.    There is no CT evidence of acute intracranial hemorrhage, mass effect, midline shift, or acute, large territorial infarct.. The basal cisterns are patent. There is a partially empty sella.    The mastoid air cells and middle ear cavities are grossly clear. There is opacification of the left sinus with sclerotic sinus walls suggesting chronicity. The remaining visualized paranasal sinuses are well aerated.    The calvarium and skull base are grossly intact.    IMPRESSION:  Motion limited, although no definite acute intracranial hemorrhage or masseffect.    Chronic microvascular disease.    Ventricular dilatation somewhat out of proportion to cortical sulcal prominence, which may be seen with normal pressure hydrocephalus.    < end of copied text >    Xray Hip w/ Pelvis 2 or 3 Views, Left (21 @ 02:37) >  EXAM:  XR HIP WITH PELV 2-3V LT                          EXAM:  XR FEMUR 2 VIEWS LT                            PROCEDURE DATE:  2021          INTERPRETATION:  Left hip with full pelvic view and left femur. Patient had a fall with local trauma.    Left hip with pelvis. 3 views.    Hips are relatively free of degeneration.    There is a left hip intertrochanteric fracture with comminution of the lesser trochanter and increased angulation at the fracture site.    Left femur. Previews.    Lower femur intact. On these views the angulation at the fracture has improved.    IMPRESSION: Left hip fracture as above.    < end of copied text >    Imaging Personally Reviewed:  YES  Consultant(s) Notes Reviewed:   YES  Care Discussed with Consultants : Ortho, Psych    Plan of care was discussed with patient and /or primary care giver; all questions and concerns were addressed and care was aligned with patient's wishes.

## 2021-06-02 NOTE — CONSULT NOTE ADULT - PROBLEM SELECTOR RECOMMENDATION 9
[facility]  For OR today.  Can keep on morphine iv prn.    High risk medications reviewed. Avoid polypharmacy. Avoid IV opioids. Avoid NSAIDs and benzodiazepines. Non-pharmacological sleep aides initiated. Non-opioid medications and non-pharmacological pain management measures initiated.   Post op - see below.  Pt with prolonged qt.  Caution use of meds that affect qt.    Routine Meds  - Acetaminophen 1 gram po three times day for 4 days  Mild Pain ( Pain Level - 1-3)  - Non - Pharmacological Measures  Moderate Pain (Pain Level 4-6)  - Oxycodone 2.5mg po q 4 hours prn  Severe Pain ( Pain Level - 7-10)  - Oxycodone 5mg po q 4 hours prn  Bowel Regimen   - Senna and Miralax  OOB/PT

## 2021-06-02 NOTE — CONSULT NOTE ADULT - SUBJECTIVE AND OBJECTIVE BOX
71 year old woman with no known medical conditions, but with documented psych hx of schizoaffective and bipolar disorders not noted to be on medications however.  She was brought in by EMS on account of a mechanical fall after losing her balance falling backwards on her left hip. She was unable to get up by herself and stayed on the floor until help came. She only complained of left hip pain afterwards.      < from: CT Pelvis Bony Only No Cont (06.02.21 @ 02:34) >  Findings:  Acute, impacted intertrochanteric fracture of the left femoral neck. The pubic rami, sacrum and coccyx are intact. Degenerative changes in the imaged lower lumbar spine, most prominent at L4-5 where there is grade 1 anterolisthesis and uncovering of the disc with at least moderate central stenosis and mild to moderate bilateral foraminal stenosis.    Hemorrhage surrounds the left femoral neck fracture. No hip joint effusion. No subcutaneous hematoma. No intrapelvic hemorrhage. Diverticulosis.    Impression:  Impacted intertrochanteric fracture of the left femoral neck        < end of copied text >      Vital Signs Last 24 Hrs  T(C): 36.9 (01 Jun 2021 22:32), Max: 36.9 (01 Jun 2021 22:32)  T(F): 98.5 (01 Jun 2021 22:32), Max: 98.5 (01 Jun 2021 22:32)  HR: 117 (01 Jun 2021 22:32) (117 - 117)  BP: 138/72 (01 Jun 2021 22:32) (138/72 - 138/72)  BP(mean): --  RR: 16 (01 Jun 2021 22:32) (16 - 16)  SpO2: 98% (01 Jun 2021 22:32) (98% - 98%)                          13.0   14.49 )-----------( 348      ( 01 Jun 2021 23:21 )             39.1   06-01    138  |  105  |  26<H>  ----------------------------<  127<H>  3.9   |  22  |  0.71    Ca    9.1      01 Jun 2021 23:21    TPro  8.1  /  Alb  3.7  /  TBili  0.7  /  DBili  x   /  AST  47<H>  /  ALT  31  /  AlkPhos  96  06-01   71 year old woman with no known medical conditions, but with documented psych hx of schizoaffective and bipolar disorders not noted to be on medications however.  She was brought in by EMS on account of a mechanical fall after losing her balance falling backwards on her left hip. She was unable to get up by herself and stayed on the floor until help came. She only complained of left hip pain afterwards.      < from: CT Pelvis Bony Only No Cont (06.02.21 @ 02:34) >  Findings:  Acute, impacted intertrochanteric fracture of the left femoral neck. The pubic rami, sacrum and coccyx are intact. Degenerative changes in the imaged lower lumbar spine, most prominent at L4-5 where there is grade 1 anterolisthesis and uncovering of the disc with at least moderate central stenosis and mild to moderate bilateral foraminal stenosis.    Hemorrhage surrounds the left femoral neck fracture. No hip joint effusion. No subcutaneous hematoma. No intrapelvic hemorrhage. Diverticulosis.    Impression:  Impacted intertrochanteric fracture of the left femoral neck        < end of copied text >      Vital Signs Last 24 Hrs  T(C): 36.9 (01 Jun 2021 22:32), Max: 36.9 (01 Jun 2021 22:32)  T(F): 98.5 (01 Jun 2021 22:32), Max: 98.5 (01 Jun 2021 22:32)  HR: 117 (01 Jun 2021 22:32) (117 - 117)  BP: 138/72 (01 Jun 2021 22:32) (138/72 - 138/72)  BP(mean): --  RR: 16 (01 Jun 2021 22:32) (16 - 16)  SpO2: 98% (01 Jun 2021 22:32) (98% - 98%)                          13.0   14.49 )-----------( 348      ( 01 Jun 2021 23:21 )             39.1   06-01    138  |  105  |  26<H>  ----------------------------<  127<H>  3.9   |  22  |  0.71    Ca    9.1      01 Jun 2021 23:21    TPro  8.1  /  Alb  3.7  /  TBili  0.7  /  DBili  x   /  AST  47<H>  /  ALT  31  /  AlkPhos  96  06-01    Pt awake, alert  NAD  S1S2  good b/l air entry  L hip pain, short/rot ext, dec ROM  NVI  palp distal pulses

## 2021-06-02 NOTE — H&P ADULT - ASSESSMENT
72 y/o female from home lives alone with PMH schizoaffective disorder (paranoid schizophrenia) and no other known comorbidities (does not follow with a PCP), BIBEMS after having an episode of mechanical fall, coming in after fall. admitted for intertrochanteric fracture of the left femoral neck. ([facility])

## 2021-06-02 NOTE — CONSULT NOTE ADULT - SUBJECTIVE AND OBJECTIVE BOX
Source of information: , Chart review, patient  Patient language: English  : n/a    CC: Patient is a 71y old  Female who presents with a chief complaint of intertrochanteric fracture/ GoHCO (2021 06:22)      HPI:  70 y/o female from home lives alone with PMH schizoaffective disorder (paranoid schizophrenia) and no other known comorbidities (does not follow with a PCP), BIBEMS after having an episode of mechanical fall, coming in after fall. patient endorsed at 4pm on Monday, was in her bedroom , was trying to take off her shirt when she lost her balance and fell backwards on her left hip. denies loc or head trauma. She was unable to get up by herself and stayed on the floor until  she was found by son who called EMS.  She states in the meantime she just fell asleep on the floor. She only complained of left hip pain afterwards. pt also reports urinary sx , dysuria and frequency recently. pt denies any history of cardiac disease , prior CVA (stroke or TIA). pt denies active smoking but reports she stopped 1 month ago.    (2021 04:51)      Patient stated goal for pain control: to be able to take deep breaths, utilize incentive spirometer, get out of bed to chair and ambulate with tolerable pain control.     PAIN SCORE:       SCALE USED: (1-10 VNRS)    PAST MEDICAL & SURGICAL HISTORY:  Schizoaffective disorder    Bipolar disorder    No significant past surgical history        FAMILY HISTORY:  No pertinent family history in first degree relatives          SOCIAL HISTORY:  [ ] Denies Smoking, Alcohol, or Drug Use    Allergies    No Known Allergies    Intolerances        MEDICATIONS:    MEDICATIONS  (STANDING):  cefTRIAXone   IVPB 1000 milliGRAM(s) IV Intermittent every 24 hours  chlorhexidine 2% Cloths 1 Application(s) Topical every 12 hours  povidone iodine 5% Nasal Swab 1 Application(s) Both Nostrils once  sodium chloride 0.9%. 1000 milliLiter(s) (125 mL/Hr) IV Continuous <Continuous>    MEDICATIONS  (PRN):  acetaminophen   Tablet .. 650 milliGRAM(s) Oral every 6 hours PRN Mild Pain (1 - 3)  morphine  - Injectable 2 milliGRAM(s) IV Push every 4 hours PRN Severe Pain (7 - 10)  oxycodone    5 mG/acetaminophen 325 mG 1 Tablet(s) Oral every 4 hours PRN Moderate Pain (4 - 6)      Vital Signs Last 24 Hrs  T(C): 36.8 (2021 10:56), Max: 37.3 (2021 10:24)  T(F): 98.2 (2021 10:56), Max: 99.1 (2021 10:24)  HR: 93 (2021 10:56) (93 - 117)  BP: 138/58 (2021 10:56) (106/65 - 144/84)  BP(mean): --  RR: 19 (2021 10:56) (16 - 20)  SpO2: 97% (2021 10:56) (97% - 99%)    LABS:                          13.0   14.49 )-----------( 348      ( 2021 23:21 )             39.1     06-01    138  |  105  |  26<H>  ----------------------------<  127<H>  3.9   |  22  |  0.71    Ca    9.1      2021 23:21    TPro  8.1  /  Alb  3.7  /  TBili  0.7  /  DBili  x   /  AST  47<H>  /  ALT  31  /  AlkPhos  96  06-01      LIVER FUNCTIONS - ( 2021 23:21 )  Alb: 3.7 g/dL / Pro: 8.1 g/dL / ALK PHOS: 96 U/L / ALT: 31 U/L DA / AST: 47 U/L / GGT: x           Urinalysis Basic - ( 2021 04:13 )    Color: Yellow / Appearance: Clear / S.025 / pH: x  Gluc: x / Ketone: Trace  / Bili: Negative / Urobili: Negative   Blood: x / Protein: 30 mg/dL / Nitrite: Negative   Leuk Esterase: Moderate / RBC: 2-5 /HPF / WBC 11-25 /HPF   Sq Epi: x / Non Sq Epi: Few /HPF / Bacteria: Moderate /HPF      CAPILLARY BLOOD GLUCOSE          REVIEW OF SYSTEMS:  CONSTITUTIONAL: No fever or fatigue  RESPIRATORY: No cough, wheezing, chills or hemoptysis; No shortness of breath  CARDIOVASCULAR: No chest pain, palpitations, dizziness, or leg swelling  GASTROINTESTINAL: No abdominal or epigastric pain. No nausea, vomiting; No diarrhea or constipation.   GENITOURINARY: No dysuria, frequency, hematuria, retention or incontinence  MUSCULOSKELETAL: No joint pain or swelling; No muscle, back, or extremity pain, no upper or lower motor strength weakness, no saddle anesthesia, bowel/bladder incontinence, no falls   NEURO: No weakness, no numbness   PSYCHIATRIC: No depression, anxiety, mood swings, or difficulty sleeping    PHYSICAL EXAM:  GENERAL:  Alert & Oriented X3, NAD, Good concentration  CHEST/LUNG: Clear to auscultation bilaterally; No rales, rhonchi, wheezing, or rubs  HEART: Regular rate and rhythm; No murmurs, rubs, or gallops  ABDOMEN: Soft, Nontender, Nondistended; Bowel sounds present  : no incontinence, no flank pain  EXTREMITIES:  2+ Peripheral Pulses, No cyanosis, or edema  MUSCULOSKELETAL: Motor Strength 5/5 B/L upper and lower extremities; moves all extremities equally against gravity; ROM intact; negative SRL  NEUROLOGICAL: awake and alert and oriented   SKIN: No rashes or lesions    Radiology:    Drug Screen:    cefTRIAXone   IVPB 1000 milliGRAM(s) IV Intermittent every 24 hours        ORT Score   Family Hx of substance abuse	Female	Male  Alcohol 	                                              1              3  Illegal drugs	                                      2              3  Rx drugs                                               4	      4    Personal Hx of substance abuse		  Alcohol 	                                               3	      3  Illegal drugs                                  	       4	      4  Rx drugs                                                5	      5  Age between 16- 45 years	               1             1  hx preadolescent sexual abuse	       3	      0  Psychological disease		  ADD, OCD, bipolar, schizophrenia	2	      2  Depression                                    	1	      1  Score totals 		  		  a score of 3 or lower indicates low risk for opioid abuse		  a score of 4-7 indicates moderate risk for opioid abuse		  a score of 8 or higher indicates high risk for opioid abuse	    Risk factors associated with adverse outcomes related to opioid treatment  [ ]  Concurrent benzodiazepine use  [ ]  History/ Active substance use or alcohol use disorder  [ ] Psychiatric co-morbidity  [ ] Sleep apnea  [ ] COPD  [ ] BMI> 35  [ ] Liver dysfunction  [ ] Renal dysfunction  [ ] CHF  [ ] Smoker  [ ]  Age > 60 years      [ ]  [facility]  Reviewed and Copied to Chart. See below.           Source of information: , Chart review, patient  Patient language: English  : n/a    CC: Patient is a 71y old  Female who presents with a chief complaint of intertrochanteric fracture/ GoHCO (2021 06:22)      HPI:  72 y/o female from home lives alone with PMH schizoaffective disorder (paranoid schizophrenia) and no other known comorbidities (does not follow with a PCP), BIBEMS after having an episode of mechanical fall, coming in after fall. patient endorsed at 4pm on Monday, was in her bedroom , was trying to take off her shirt when she lost her balance and fell backwards on her left hip. denies loc or head trauma. She was unable to get up by herself and stayed on the floor until  she was found by son who called EMS.  She states in the meantime she just fell asleep on the floor. She only complained of left hip pain afterwards. pt also reports urinary sx , dysuria and frequency recently. pt denies any history of cardiac disease , prior CVA (stroke or TIA). pt denies active smoking but reports she stopped 1 month ago.    (2021 04:51)      Pt s/p fall as described above.  Pt with left hip fracture.  + left hip pain.  Pain increases on exertion.  No nausea or vomiting.  No chest pain or sob.  Pt for possible OR today.  Pt is a poor historian.      PAIN SCORE:    4/10   SCALE USED: (1-10 VNRS)    PAST MEDICAL & SURGICAL HISTORY:  Schizoaffective disorder    Bipolar disorder    No significant past surgical history        FAMILY HISTORY:  No pertinent family history in first degree relatives          SOCIAL HISTORY:  [x ] Denies Smoking, Alcohol, or Drug Use  + history of past smoking    Allergies    No Known Allergies    Intolerances        MEDICATIONS:    MEDICATIONS  (STANDING):  cefTRIAXone   IVPB 1000 milliGRAM(s) IV Intermittent every 24 hours  chlorhexidine 2% Cloths 1 Application(s) Topical every 12 hours  povidone iodine 5% Nasal Swab 1 Application(s) Both Nostrils once  sodium chloride 0.9%. 1000 milliLiter(s) (125 mL/Hr) IV Continuous <Continuous>    MEDICATIONS  (PRN):  acetaminophen   Tablet .. 650 milliGRAM(s) Oral every 6 hours PRN Mild Pain (1 - 3)  morphine  - Injectable 2 milliGRAM(s) IV Push every 4 hours PRN Severe Pain (7 - 10)  oxycodone    5 mG/acetaminophen 325 mG 1 Tablet(s) Oral every 4 hours PRN Moderate Pain (4 - 6)      Vital Signs Last 24 Hrs  T(C): 36.8 (2021 10:56), Max: 37.3 (2021 10:24)  T(F): 98.2 (2021 10:56), Max: 99.1 (2021 10:24)  HR: 93 (2021 10:56) (93 - 117)  BP: 138/58 (2021 10:56) (106/65 - 144/84)  BP(mean): --  RR: 19 (2021 10:56) (16 - 20)  SpO2: 97% (2021 10:56) (97% - 99%)    LABS:                          13.0   14.49 )-----------( 348      ( 2021 23:21 )             39.1     06-01    138  |  105  |  26<H>  ----------------------------<  127<H>  3.9   |  22  |  0.71    Ca    9.1      2021 23:21    TPro  8.1  /  Alb  3.7  /  TBili  0.7  /  DBili  x   /  AST  47<H>  /  ALT  31  /  AlkPhos  96  06-01      LIVER FUNCTIONS - ( 2021 23:21 )  Alb: 3.7 g/dL / Pro: 8.1 g/dL / ALK PHOS: 96 U/L / ALT: 31 U/L DA / AST: 47 U/L / GGT: x           Urinalysis Basic - ( 2021 04:13 )    Color: Yellow / Appearance: Clear / S.025 / pH: x  Gluc: x / Ketone: Trace  / Bili: Negative / Urobili: Negative   Blood: x / Protein: 30 mg/dL / Nitrite: Negative   Leuk Esterase: Moderate / RBC: 2-5 /HPF / WBC 11-25 /HPF   Sq Epi: x / Non Sq Epi: Few /HPF / Bacteria: Moderate /HPF      CAPILLARY BLOOD GLUCOSE          REVIEW OF SYSTEMS:  - unable to obtain due to mentation     PHYSICAL EXAM:  GENERAL:  Alert & Oriented X3, NAD, Good concentration  CHEST/LUNG: Clear to auscultation bilaterally; No rales, rhonchi, wheezing, or rubs  HEART: Regular rate and rhythm; No murmurs, rubs, or gallops  ABDOMEN: Soft, Nontender, Nondistended; Bowel sounds present  : no incontinence, no flank pain  EXTREMITIES:  2+ Peripheral Pulses, No cyanosis, or edema  MUSCULOSKELETAL: + left hip pain + left hip decreased rom   NEUROLOGICAL: awake and alert and confused   SKIN: No rashes or lesions    Radiology:    Drug Screen:    cefTRIAXone   IVPB 1000 milliGRAM(s) IV Intermittent every 24 hours        ORT Score   Family Hx of substance abuse	Female	Male  Alcohol 	                                              1              3  Illegal drugs	                                    2              3  Rx drugs                                               4	      4    Personal Hx of substance abuse		  Alcohol 	                                               3	      3  Illegal drugs                                  	 4	      4  Rx drugs                                                5	      5  Age between 16- 45 years	               1             1  hx preadolescent sexual abuse	            3	      0  Psychological disease		  ADD, OCD, bipolar, schizophrenia	2	      2  Depression                                    	1	      1  Score totals 		  		  a score of 3 or lower indicates low risk for opioid abuse		  a score of 4-7 indicates moderate risk for opioid abuse		  a score of 8 or higher indicates high risk for opioid abuse	    Risk factors associated with adverse outcomes related to opioid treatment  [ ]  Concurrent benzodiazepine use  [ ]  History/ Active substance use or alcohol use disorder  [ ] Psychiatric co-morbidity  [ ] Sleep apnea  [ ] COPD  [ ] BMI> 35  [ ] Liver dysfunction  [ ] Renal dysfunction  [ ] CHF  [ ] Smoker  [x ]  Age > 60 years      [x ]  [place]  Reviewed and Copied to Chart. See below.

## 2021-06-02 NOTE — PROGRESS NOTE ADULT - PROBLEM SELECTOR PLAN 3
was on floor for unknown duration  -Ceratin kinase 1340  -Cont IVF  c/w moderate aggressive IVF therapy was on floor for unknown duration  -Ceratin kinase 1340  -Cont IVF hydration  -f/u CKs

## 2021-06-02 NOTE — H&P ADULT - PROBLEM SELECTOR PLAN 1
left lower extremity  shortened with external rotation  HIP X ray : Impacted intertrochanteric fracture of the left femoral neck  Met >4, patient climbs >2 flight of stairs  RCRI and SHANTELL risk (Galvan risk scale) zero  Ortho on board  NPO    pain management    PT   SW

## 2021-06-02 NOTE — PROGRESS NOTE ADULT - SUBJECTIVE AND OBJECTIVE BOX
NP Note discussed with  Primary Attending    Patient is a 71y old  Female who presents with a chief complaint of intertrochanteric fracture/ GoHCO (2021 06:22)      INTERVAL HPI/OVERNIGHT EVENTS: no new complaints    MEDICATIONS  (STANDING):  cefTRIAXone   IVPB 1000 milliGRAM(s) IV Intermittent every 24 hours  sodium chloride 0.9%. 1000 milliLiter(s) (125 mL/Hr) IV Continuous <Continuous>    MEDICATIONS  (PRN):  acetaminophen   Tablet .. 650 milliGRAM(s) Oral every 6 hours PRN Mild Pain (1 - 3)  morphine  - Injectable 2 milliGRAM(s) IV Push every 4 hours PRN Severe Pain (7 - 10)  oxycodone    5 mG/acetaminophen 325 mG 1 Tablet(s) Oral every 4 hours PRN Moderate Pain (4 - 6)      __________________________________________________  REVIEW OF SYSTEMS:  unable to obtain pt. is confused    Vital Signs Last 24 Hrs  T(C): 37.3 (2021 10:24), Max: 37.3 (2021 10:24)  T(F): 99.1 (2021 10:24), Max: 99.1 (2021 10:24)  HR: 96 (2021 10:24) (96 - 117)  BP: 106/65 (2021 10:24) (106/65 - 144/84)  BP(mean): --  RR: 17 (2021 10:24) (16 - 20)  SpO2: 99% (2021 10:24) (98% - 99%)    ________________________________________________  PHYSICAL EXAM: confused, well drvrloped  GENERAL: NAD  HEENT: Normocephalic;  conjunctivae and sclerae clear; moist mucous membranes;   NECK : supple  CHEST/LUNG: Clear to auscultation bilaterally with good air entry   HEART: S1 S2  regular; no murmurs, gallops or rubs  ABDOMEN: Soft, Nontender, Nondistended; Bowel sounds present  EXTREMITIES: no cyanosis; no edema; no calf tenderness  SKIN: warm and dry; no rash  NERVOUS SYSTEM:  Awake and alert;  confused    _________________________________________________  LABS:                        13.0   14.49 )-----------( 348      ( 2021 23:21 )             39.1         138  |  105  |  26<H>  ----------------------------<  127<H>  3.9   |  22  |  0.71    Ca    9.1      2021 23:21    TPro  8.1  /  Alb  3.7  /  TBili  0.7  /  DBili  x   /  AST  47<H>  /  ALT  31  /  AlkPhos  96        Urinalysis Basic - ( 2021 04:13 )    Color: Yellow / Appearance: Clear / S.025 / pH: x  Gluc: x / Ketone: Trace  / Bili: Negative / Urobili: Negative   Blood: x / Protein: 30 mg/dL / Nitrite: Negative   Leuk Esterase: Moderate / RBC: 2-5 /HPF / WBC 11-25 /HPF   Sq Epi: x / Non Sq Epi: Few /HPF / Bacteria: Moderate /HPF      CAPILLARY BLOOD GLUCOSE    RADIOLOGY & ADDITIONAL TESTS:    Xray Chest 1 View AP/PA (21 @ 02:25) >  EXAM:  XR CHEST AP OR PA 1V                            PROCEDURE DATE:  2021      INTERPRETATION:  CLINICAL INDICATION: 71 years  Female with trauma/fall.    COMPARISON: None    AP view of the chest demonstrates the lungs to be clear. There is no pleural effusion. There is no pneumothorax.    The heart is mildly enlarged. There is no mediastinal or hilar mass.    The pulmonary vasculature is normal.    Mild thoracic degenerative changes are present. 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    IMPRESSION:    No acute infiltrate. Mild cardiomegaly.    < end of copied text >    CT Lumbar Spine No Cont (21 @ 02:33) >  EXAM:  CT LUMBAR SPINE                            PROCEDURE DATE:  2021          INTERPRETATION:  Clinical indication: Back pain status post fall    COMPARISON: None    TECHNIQUE: Noncontrast axial CT images of the lumbar spine were acquired.Coronal and sagittal reconstructions are provided.    FINDINGS:    Lumbar lordosis is maintained. Mild lumbar levoscoliosis.    9 mm degenerative anterolisthesis of L4 on L5 noted. There is moderate compression deformity of L1, age indeterminate.    Multilevel degenerative changes of the spine are noted evidenced by endplate lipping, disc space narrowing, and facet arthropathy. Multilevel ligamentum flavum hypertrophy noted.    Mild broad-based disc bulge at L1-2, L2-3 and L5-S1 with moderate disc bulge at L3-4. Uncovered disc at L4-5.    No lytic or blastic abnormalities.    Intra-abdominal structures notable for a markedly distended urinary bladder and atherosclerotic calcifications.    No paraspinal hematoma.    IMPRESSION:    Age indeterminant moderate L1 compression deformity. Please correlate for point tenderness.    < end of copied text >      CT Head No Cont (21 @ 02:33) >  EXAM:  CT BRAIN                            PROCEDURE DATE:  2021          INTERPRETATION:  CLINICAL INFORMATION:  fall/trauma    TECHNIQUE:  Axial CT images were acquired through the head.  Intravenous contrast: None  Two-dimensional reformats were generated.    COMPARISON STUDY: None    FINDINGS:  The examination is somewhat limited by patient motion artifact.    There is enlargement of the ventricular system somewhat out of proportion to cortical sulcal prominence, which may be seen with normal pressure hydrocephalus.    Patchy periventricular and subcortical white matter hypodensities are nonspecific, although likely on the basis of moderate chronic small vessel ischemic disease.    There is no CT evidence of acute intracranial hemorrhage, mass effect, midline shift, or acute, large territorial infarct.. The basal cisterns are patent. There is a partially empty sella.    The mastoid air cells and middle ear cavities are grossly clear. There is opacification of the left sinus with sclerotic sinus walls suggesting chronicity. The remaining visualized paranasal sinuses are well aerated.    The calvarium and skull base are grossly intact.    IMPRESSION:  Motion limited, although no definite acute intracranial hemorrhage or masseffect.    Chronic microvascular disease.    Ventricular dilatation somewhat out of proportion to cortical sulcal prominence, which may be seen with normal pressure hydrocephalus.    < end of copied text >    CT Pelvis Bony Only No Cont (21 @ 02:34) >  EXAM:  CT PELVIS BONY ONLY                            PROCEDURE DATE:  2021          INTERPRETATION:  Indication:   Trauma, left hip pain    Technique: Helical CT of the bony pelvis obtained without contrast, and multiplanar and 3-D reformatssubmitted.  CT dose lowering techniques were used, to include: automated exposure control, adjustment for patient size, and or use of iterative reconstruction.    Comparison: Radiographs from the same day    Findings:  Acute, impacted intertrochanteric fracture of the left femoral neck. The pubic rami, sacrum and coccyx are intact. Degenerative changes in the imaged lower lumbar spine, most prominent at L4-5 where there is grade 1 anterolisthesis and uncovering of the disc with at least moderate central stenosis and mild to moderate bilateral foraminal stenosis.    Hemorrhage surrounds the left femoral neck fracture. No hip joint effusion. No subcutaneous hematoma. No intrapelvic hemorrhage. Diverticulosis.    Impression:  Impacted intertrochanteric fracture of the left femoral neck    < end of copied text >    Urinalysis (21 @ 04:13)    Glucose Qualitative, Urine: Negative    Blood, Urine: Small    pH Urine: 5.0    Color: Yellow    Urine Appearance: Clear    Bilirubin: Negative    Ketone - Urine: Trace    Specific Gravity: 1.025    Protein, Urine: 30 mg/dL    Urobilinogen: Negative    Nitrite: Negative    Leukocyte Esterase Concentration: Moderate  Urine Microscopic-Add On (NC) (21 @ 04:13)    Comment - Urine: few amorphous urates    Epithelial Cells: Few /HPF    Red Blood Cell - Urine: 2-5 /HPF    White Blood Cell - Urine: 11-25 /HPF    Hyaline Casts: 0-2 /LPF    Bacteria: Moderate /HPF        Imaging Personally Reviewed:  YES  Consultant(s) Notes Reviewed:   YES  Care Discussed with Consultants :     Plan of care was discussed with patient and /or primary care giver; all questions and concerns were addressed and care was aligned with patient's wishes.

## 2021-06-02 NOTE — H&P ADULT - ATTENDING COMMENTS
Pt seen and examined.  Case discussed with MAR.  71 year old woman with no known medical conditions, but with documented psych hx of schizoaffective and bipolar disorders not noted to be on medications however.  She was brought in by EMS on account of a mechanical fall after losing her balance falling backwards on her left hip. She was unable to get up by herself and stayed on the floor until help came. She only complained of left hip pain afterwards.    Vital Signs Last 24 Hrs  T(C): 36.9 (2021 22:32), Max: 36.9 (2021 22:32)  T(F): 98.5 (2021 22:32), Max: 98.5 (2021 22:32)  HR: 117 (:) (117 - 117)  BP: 138/72 (2021 22:32) (138/72 - 138/72)  RR: 16 (:32) (16 - 16)  SpO2: 98% (:32) (98% - 98%)    Elderly woman, edentulous, NAD AAO X 3 -  CTA B/L RRR S1S2 only   Soft NT ND BS +  left hip tenderness- unable to move without distress.  No pedal edema.    Labs                         13.0   14.49 )-----------( 348      ( 2021 23:21 )             39.1     06-    138  |  105  |  26<H>  ----------------------------<  127<H>  3.9   |  22  |  0.71    Ca    9.1      2021 23:21    TPro  8.1  /  Alb  3.7  /  TBili  0.7  /  DBili  x   /  AST  47<H>  /  ALT  31  /  AlkPhos  96  06-    CK 1340    Urinalysis Basic - ( 2021 04:13 )    Color: Yellow / Appearance: Clear / S.025 / pH: x  Gluc: x / Ketone: Trace  / Bili: Negative / Urobili: Negative   Blood: x / Protein: 30 mg/dL / Nitrite: Negative   Leuk Esterase: Moderate / RBC: 2-5 /HPF / WBC 11-25 /HPF   Sq Epi: x / Non Sq Epi: Few /HPF / Bacteria: Moderate /HPF    EKG -     CT head  No definite acute intracranial hemorrhage or mass effect. Chronic microvascular disease.  Ventricular dilatation somewhat out of proportion to cortical sulcal prominence, which may be seen with normal pressure hydrocephalus.    - CT pelvis   Acute, impacted intertrochanteric fracture of the left femoral neck. The pubic rami, sacrum and coccyx are intact. Degenerative changes in the imaged lower lumbar spine, most prominent at L4-5 where there is grade 1 anterolisthesis and uncovering of the disc with at least moderate central stenosis and mild to moderate bilateral foraminal stenosis.  Hemorrhage surrounds the left femoral neck fracture. No hip joint effusion. No subcutaneous hematoma. No intrapelvic hemorrhage. Diverticulosis.  Impression:  Impacted intertrochanteric fracture of the left femoral neck     - CT lumbar spine -   Moderate compression deformity of L1, age indeterminate.  Multilevel degenerative changes of the spine are noted. Multilevel ligamentum flavum hypertrophy noted.  Mild broad-based disc bulge at L1-2, L2-3 and L5-S1 with moderate disc bulge at L3-4. Uncovered disc at L4-5.    Impression  -  Acute impacted intertrochanteric fracture of the left femoral neck  -  Mild rhabdomyolysis from extended stay on the floor  -  Multilevel lumbar spine degenerative changes with old L1 compression fracture  -  Hx of schizoaffective d/ o - not on meds and no acute issues noted  -  UTI  -  JOVITA    Plan   Admit to Medicine   Pain control  IVF hydration moderately aggressive for rhabdomyolysis   Urine cx and empiric IV ceftriaxone 1g daily  Keep NPO     - Pre -op evaluation   Pt as stated above has no medical conditions of note and has lived relatively healthy. She walks multiple miles a day around her neighborhood and climbs flights of stairs with no limitation.   She has had surgical procedures in the past ( breast excision, BTL , etc) and denies any surgical or anesthetic complications.  Based on the above and on the Galvan and RCRI risk scale, she has low risk for a cardiac event or mortality while undergoing this non cardiac procedure.    recommendation   - No further intervention or testing needed before surgery   - Continue pain control, NPO   - DVT ppx per surgical team    Will follow

## 2021-06-02 NOTE — H&P ADULT - PROBLEM SELECTOR PLAN 2
U/A significant for: Pyuria, LE, RBC, Bacteruria, urinary Sx+  - IV antibiotics with Rocephin started  -f/u U/C

## 2021-06-02 NOTE — PROGRESS NOTE ADULT - PROBLEM SELECTOR PLAN 1
s/p mechanical fall  -HIP X ray shows Impacted intertrochanteric fracture of the left femoral neck  -Ortho following  -Pain mgnt consulted  -NPO for possible surgical intervention s/p mechanical fall  -HIP X ray shows Impacted intertrochanteric fracture of the left femoral neck  -Ortho following  -Pain mgnt consulted  -NPO for possible surgical intervention  -Cont IVF

## 2021-06-02 NOTE — PROGRESS NOTE ADULT - ASSESSMENT
70 y/o female from home lives alone with PMH schizoaffective disorder (paranoid schizophrenia) and no other known comorbidities (does not follow with a PCP), BIBEMS after having an episode of mechanical fall, coming in after fall. admitted for intertrochanteric fracture of the left femoral neck. ([facility]).  Pt. followed by ortho, is medically cleared for surgical intervention.  Pt. with psych Hx on no current medications, her son requested her to be seen by psych for medical management, Dr. Tompkins consulted.  Pt. with no behavioral disturbances at this time however is confused x 3.

## 2021-06-02 NOTE — H&P ADULT - NSHPPHYSICALEXAM_GEN_ALL_CORE
· CONSTITUTIONAL: Well appearing, awake, alert, oriented to person, place, time/situation and in no apparent distress.  · ENMT: Airway patent, Nasal mucosa clear. Mouth with normal mucosa. Throat has no vesicles, no oropharyngeal exudates and uvula is midline.  · EYES: Clear bilaterally, pupils equal, round and reactive to light.  · CARDIAC: Normal rate, regular rhythm.  Heart sounds S1, S2.  No murmurs, rubs or gallops.  · RESPIRATORY: Breath sounds clear and equal bilaterally.  · GASTROINTESTINAL: Abdomen soft, non-tender, no guarding.  · MUSCULOSKELETAL: - - -  · MUSC EXAM: no cervical or thoracic midline tenderness, L4-L5 midline tenderness at lumbar spine, shortened and externally rotated L lower extremity and painful ROM of L hip  · NEUROLOGICAL: Alert and oriented, no focal deficits, no motor or sensory deficits.  · SKIN: Skin normal color for race, warm, dry and intact. No evidence of rash.  · PSYCHIATRIC: Alert and oriented to person, place, time/situation. normal mood and affect. no apparent risk to self or others.

## 2021-06-02 NOTE — H&P ADULT - HISTORY OF PRESENT ILLNESS
72 y/o female from home lives alone with PMH schizoaffective disorder (paranoid schizophrenia) and no other known comorbidities (does not follow with a PCP), BIBEMS after having an episode of mechanical fall, coming in after fall. patient endorsed at 4pm on Monday, was in her bedroom , was trying to take off her shirt when she lost her balance and fell backwards on her left hip. denies loc or head trauma. She was unable to get up by herself and stayed on the floor until  she was found by son who called EMS.  She states in the meantime she just fell asleep on the floor. She only complained of left hip pain afterwards. pt also reports urinary sx , dysuria and frequency recently. pt denies any history of cardiac disease , prior CVA (stroke or TIA). pt denies active smoking but reports she stopped 1 month ago, history limited due to cognition status.     72 y/o female from home lives alone with PMH schizoaffective disorder (paranoid schizophrenia) and no other known comorbidities (does not follow with a PCP), BIBEMS after having an episode of mechanical fall, coming in after fall. patient endorsed at 4pm on Monday, was in her bedroom , was trying to take off her shirt when she lost her balance and fell backwards on her left hip. denies loc or head trauma. She was unable to get up by herself and stayed on the floor until  she was found by son who called EMS.  She states in the meantime she just fell asleep on the floor. She only complained of left hip pain afterwards. pt also reports urinary sx , dysuria and frequency recently. pt denies any history of cardiac disease , prior CVA (stroke or TIA). pt denies active smoking but reports she stopped 1 month ago.

## 2021-06-02 NOTE — ED PROVIDER NOTE - CARE PLAN
Principal Discharge DX:	Closed fracture of left hip, initial encounter  Secondary Diagnosis:	UTI (urinary tract infection)

## 2021-06-02 NOTE — CONSULT NOTE ADULT - ATTENDING COMMENTS
comminuted IT fx    patient w hx of psych and state caretaker. confirmed family able to provide medical consent, telephone consent obtained  discussed prox femoral replacement vs CMN, BRA disucssed at length    will proceed w CMN  possible PWB depending upon fixation strength and stability given comminution

## 2021-06-02 NOTE — ED PROVIDER NOTE - MUSCULOSKELETAL MINIMAL EXAM
no cervical or thoracic midline tenderness, L4-L5 midline tenderness at lumbar spine, shortened and externally rotated L lower extremity and painful ROM of L hip

## 2021-06-03 DIAGNOSIS — Z02.9 ENCOUNTER FOR ADMINISTRATIVE EXAMINATIONS, UNSPECIFIED: ICD-10-CM

## 2021-06-03 DIAGNOSIS — Z71.89 OTHER SPECIFIED COUNSELING: ICD-10-CM

## 2021-06-03 LAB
ANION GAP SERPL CALC-SCNC: 11 MMOL/L — SIGNIFICANT CHANGE UP (ref 5–17)
ANION GAP SERPL CALC-SCNC: 9 MMOL/L — SIGNIFICANT CHANGE UP (ref 5–17)
BUN SERPL-MCNC: 14 MG/DL — SIGNIFICANT CHANGE UP (ref 7–18)
BUN SERPL-MCNC: 14 MG/DL — SIGNIFICANT CHANGE UP (ref 7–18)
CALCIUM SERPL-MCNC: 7.8 MG/DL — LOW (ref 8.4–10.5)
CALCIUM SERPL-MCNC: 8.1 MG/DL — LOW (ref 8.4–10.5)
CHLORIDE SERPL-SCNC: 107 MMOL/L — SIGNIFICANT CHANGE UP (ref 96–108)
CHLORIDE SERPL-SCNC: 108 MMOL/L — SIGNIFICANT CHANGE UP (ref 96–108)
CK SERPL-CCNC: 986 U/L — HIGH (ref 21–215)
CO2 SERPL-SCNC: 21 MMOL/L — LOW (ref 22–31)
CO2 SERPL-SCNC: 23 MMOL/L — SIGNIFICANT CHANGE UP (ref 22–31)
COVID-19 SPIKE DOMAIN AB INTERP: POSITIVE
COVID-19 SPIKE DOMAIN ANTIBODY RESULT: 16.1 U/ML — HIGH
CREAT SERPL-MCNC: 0.36 MG/DL — LOW (ref 0.5–1.3)
CREAT SERPL-MCNC: 0.53 MG/DL — SIGNIFICANT CHANGE UP (ref 0.5–1.3)
CULTURE RESULTS: SIGNIFICANT CHANGE UP
GLUCOSE SERPL-MCNC: 111 MG/DL — HIGH (ref 70–99)
GLUCOSE SERPL-MCNC: 98 MG/DL — SIGNIFICANT CHANGE UP (ref 70–99)
HCT VFR BLD CALC: 31.8 % — LOW (ref 34.5–45)
HCT VFR BLD CALC: 32.6 % — LOW (ref 34.5–45)
HCV AB S/CO SERPL IA: 0.09 S/CO — SIGNIFICANT CHANGE UP (ref 0–0.99)
HCV AB SERPL-IMP: SIGNIFICANT CHANGE UP
HGB BLD-MCNC: 10.6 G/DL — LOW (ref 11.5–15.5)
HGB BLD-MCNC: 10.6 G/DL — LOW (ref 11.5–15.5)
LACTATE SERPL-SCNC: 0.9 MMOL/L — SIGNIFICANT CHANGE UP (ref 0.7–2)
MCHC RBC-ENTMCNC: 30 PG — SIGNIFICANT CHANGE UP (ref 27–34)
MCHC RBC-ENTMCNC: 30.3 PG — SIGNIFICANT CHANGE UP (ref 27–34)
MCHC RBC-ENTMCNC: 32.5 GM/DL — SIGNIFICANT CHANGE UP (ref 32–36)
MCHC RBC-ENTMCNC: 33.3 GM/DL — SIGNIFICANT CHANGE UP (ref 32–36)
MCV RBC AUTO: 90.9 FL — SIGNIFICANT CHANGE UP (ref 80–100)
MCV RBC AUTO: 92.4 FL — SIGNIFICANT CHANGE UP (ref 80–100)
NRBC # BLD: 0 /100 WBCS — SIGNIFICANT CHANGE UP (ref 0–0)
NRBC # BLD: 0 /100 WBCS — SIGNIFICANT CHANGE UP (ref 0–0)
PLATELET # BLD AUTO: 249 K/UL — SIGNIFICANT CHANGE UP (ref 150–400)
PLATELET # BLD AUTO: 256 K/UL — SIGNIFICANT CHANGE UP (ref 150–400)
POTASSIUM SERPL-MCNC: 3.5 MMOL/L — SIGNIFICANT CHANGE UP (ref 3.5–5.3)
POTASSIUM SERPL-MCNC: 3.9 MMOL/L — SIGNIFICANT CHANGE UP (ref 3.5–5.3)
POTASSIUM SERPL-SCNC: 3.5 MMOL/L — SIGNIFICANT CHANGE UP (ref 3.5–5.3)
POTASSIUM SERPL-SCNC: 3.9 MMOL/L — SIGNIFICANT CHANGE UP (ref 3.5–5.3)
RBC # BLD: 3.5 M/UL — LOW (ref 3.8–5.2)
RBC # BLD: 3.53 M/UL — LOW (ref 3.8–5.2)
RBC # FLD: 13.3 % — SIGNIFICANT CHANGE UP (ref 10.3–14.5)
RBC # FLD: 13.3 % — SIGNIFICANT CHANGE UP (ref 10.3–14.5)
SARS-COV-2 IGG+IGM SERPL QL IA: 16.1 U/ML — HIGH
SARS-COV-2 IGG+IGM SERPL QL IA: POSITIVE
SODIUM SERPL-SCNC: 139 MMOL/L — SIGNIFICANT CHANGE UP (ref 135–145)
SODIUM SERPL-SCNC: 140 MMOL/L — SIGNIFICANT CHANGE UP (ref 135–145)
SPECIMEN SOURCE: SIGNIFICANT CHANGE UP
WBC # BLD: 11.12 K/UL — HIGH (ref 3.8–10.5)
WBC # BLD: 13.74 K/UL — HIGH (ref 3.8–10.5)
WBC # FLD AUTO: 11.12 K/UL — HIGH (ref 3.8–10.5)
WBC # FLD AUTO: 13.74 K/UL — HIGH (ref 3.8–10.5)

## 2021-06-03 PROCEDURE — 99233 SBSQ HOSP IP/OBS HIGH 50: CPT

## 2021-06-03 PROCEDURE — 27245 TREAT THIGH FRACTURE: CPT | Mod: LT

## 2021-06-03 PROCEDURE — 99232 SBSQ HOSP IP/OBS MODERATE 35: CPT

## 2021-06-03 PROCEDURE — 90792 PSYCH DIAG EVAL W/MED SRVCS: CPT

## 2021-06-03 RX ORDER — OXYCODONE HYDROCHLORIDE 5 MG/1
5 TABLET ORAL EVERY 4 HOURS
Refills: 0 | Status: DISCONTINUED | OUTPATIENT
Start: 2021-06-03 | End: 2021-06-05

## 2021-06-03 RX ORDER — ACETAMINOPHEN 500 MG
1000 TABLET ORAL EVERY 8 HOURS
Refills: 0 | Status: DISCONTINUED | OUTPATIENT
Start: 2021-06-03 | End: 2021-06-05

## 2021-06-03 RX ORDER — HYDROMORPHONE HYDROCHLORIDE 2 MG/ML
0.5 INJECTION INTRAMUSCULAR; INTRAVENOUS; SUBCUTANEOUS
Refills: 0 | Status: DISCONTINUED | OUTPATIENT
Start: 2021-06-03 | End: 2021-06-03

## 2021-06-03 RX ORDER — SENNA PLUS 8.6 MG/1
2 TABLET ORAL AT BEDTIME
Refills: 0 | Status: DISCONTINUED | OUTPATIENT
Start: 2021-06-03 | End: 2021-06-05

## 2021-06-03 RX ORDER — POLYETHYLENE GLYCOL 3350 17 G/17G
17 POWDER, FOR SOLUTION ORAL DAILY
Refills: 0 | Status: DISCONTINUED | OUTPATIENT
Start: 2021-06-03 | End: 2021-06-05

## 2021-06-03 RX ORDER — BENZOCAINE AND MENTHOL 5; 1 G/100ML; G/100ML
1 LIQUID ORAL EVERY 4 HOURS
Refills: 0 | Status: DISCONTINUED | OUTPATIENT
Start: 2021-06-03 | End: 2021-06-05

## 2021-06-03 RX ORDER — HYDROMORPHONE HYDROCHLORIDE 2 MG/ML
1 INJECTION INTRAMUSCULAR; INTRAVENOUS; SUBCUTANEOUS
Refills: 0 | Status: DISCONTINUED | OUTPATIENT
Start: 2021-06-03 | End: 2021-06-03

## 2021-06-03 RX ORDER — OXYCODONE HYDROCHLORIDE 5 MG/1
2.5 TABLET ORAL EVERY 4 HOURS
Refills: 0 | Status: DISCONTINUED | OUTPATIENT
Start: 2021-06-03 | End: 2021-06-05

## 2021-06-03 RX ORDER — CEFAZOLIN SODIUM 1 G
2000 VIAL (EA) INJECTION EVERY 8 HOURS
Refills: 0 | Status: COMPLETED | OUTPATIENT
Start: 2021-06-03 | End: 2021-06-04

## 2021-06-03 RX ORDER — ENOXAPARIN SODIUM 100 MG/ML
30 INJECTION SUBCUTANEOUS EVERY 12 HOURS
Refills: 0 | Status: DISCONTINUED | OUTPATIENT
Start: 2021-06-03 | End: 2021-06-05

## 2021-06-03 RX ORDER — PANTOPRAZOLE SODIUM 20 MG/1
40 TABLET, DELAYED RELEASE ORAL
Refills: 0 | Status: DISCONTINUED | OUTPATIENT
Start: 2021-06-03 | End: 2021-06-05

## 2021-06-03 RX ORDER — SODIUM CHLORIDE 9 MG/ML
1000 INJECTION INTRAMUSCULAR; INTRAVENOUS; SUBCUTANEOUS
Refills: 0 | Status: DISCONTINUED | OUTPATIENT
Start: 2021-06-03 | End: 2021-06-05

## 2021-06-03 RX ADMIN — CHLORHEXIDINE GLUCONATE 1 APPLICATION(S): 213 SOLUTION TOPICAL at 05:02

## 2021-06-03 RX ADMIN — Medication 1000 MILLIGRAM(S): at 22:07

## 2021-06-03 RX ADMIN — CEFTRIAXONE 100 MILLIGRAM(S): 500 INJECTION, POWDER, FOR SOLUTION INTRAMUSCULAR; INTRAVENOUS at 05:02

## 2021-06-03 RX ADMIN — Medication 100 MILLIGRAM(S): at 17:50

## 2021-06-03 RX ADMIN — Medication 1000 MILLIGRAM(S): at 21:35

## 2021-06-03 RX ADMIN — SODIUM CHLORIDE 115 MILLILITER(S): 9 INJECTION INTRAMUSCULAR; INTRAVENOUS; SUBCUTANEOUS at 17:50

## 2021-06-03 RX ADMIN — ENOXAPARIN SODIUM 30 MILLIGRAM(S): 100 INJECTION SUBCUTANEOUS at 21:35

## 2021-06-03 RX ADMIN — SENNA PLUS 2 TABLET(S): 8.6 TABLET ORAL at 21:35

## 2021-06-03 RX ADMIN — Medication 1000 MILLIGRAM(S): at 14:30

## 2021-06-03 RX ADMIN — Medication 1000 MILLIGRAM(S): at 13:41

## 2021-06-03 NOTE — PROGRESS NOTE ADULT - ASSESSMENT
70 y/o female from home lives alone with PMH schizoaffective disorder (paranoid schizophrenia) not currently on psych meds and no other known comorbidities. BIBEMS after having an episode of mechanical fall, admitted to fpr intertrochanteric fracture of the left femoral neck. ([facility]).  Found with positive UA+ UCX with 3+ organisms likely contaminated- Started on Ceftriaxone. Psych was consulted for medication management. Plan for IM nailing today 6/3 with Ortho  70 y/o female from home lives alone with PMH schizoaffective disorder (paranoid schizophrenia) not currently on psych meds and no other known comorbidities. BIBEMS after having an episode of mechanical fall, admitted to fpr intertrochanteric fracture of the left femoral neck. ([facility]).  Found with positive UA+ UCX with 3+ organisms likely contaminated- Started on Ceftriaxone. Psych was consulted for medication management. Plan for IM nailing today 6/3. Patient found to have a court appointed guardian, family endorses she does not care for herself and does not allow them to help her. Refuses Drs appts, and or psych meds

## 2021-06-03 NOTE — PROGRESS NOTE ADULT - ASSESSMENT
clinical application.   Patient Search   Multi-Patient Search   MME Calculator   Reports   Drug List   Designation   My NIECY #  Data Detail Level: Printer-Friendly View Extended View  Confidential Drug Utilization Report  Search Terms: lynnette sawant, 1950Search Date: 06/02/2021 12:07:40 PM  The Drug Utilization Report below displays all of the controlled substance prescriptions, if any, that your patient has filled in the last twelve months. The information displayed on this report is compiled from pharmacy submissions to the Department, and accurately reflects the information as submitted by the pharmacies.    This report was requested by: Melanie Barrett | Reference #: [id number]    There are no results for the search terms that you entered.

## 2021-06-03 NOTE — PROGRESS NOTE ADULT - CONVERSATION DETAILS
Extensive discussion took place over the phone with Wilbur (son) and with the patients guardian, Sharita.   Both parties also discussed offline regarding their decision for surgery. Both parties agreed that surgery is a priority and they were available via telephone for questions , etc.    Son mentioned that he was concerned that the patient hasn't taken any psych medications for her psych condition and requested for psych consult. Dr Barrett medical hospitalist called for psych.     goals of care: for patient to return to their "regular" functional status and be able to ambulate again.  As per guardian, surgical consent for surgery must be obtained by Son, Wilbur, who is very involved with the patient and all questions were answered    good outcome/prognosis is projected    FULL CODE as per lillie

## 2021-06-03 NOTE — PROGRESS NOTE ADULT - PROBLEM SELECTOR PLAN 3
Found on floor for unknown duration  -Creatinine kinase downtrending today 986  -Cont IVF hydration  -trend CKs

## 2021-06-03 NOTE — BH CONSULTATION LIAISON ASSESSMENT NOTE - HPI (INCLUDE ILLNESS QUALITY, SEVERITY, DURATION, TIMING, CONTEXT, MODIFYING FACTORS, ASSOCIATED SIGNS AND SYMPTOMS)
71F,  and living alone, with PHx of bipolar DO vs SAD with h/o multiple past IP adms (latest 5/2020-7/2020 @[facility]), not currently in tx or taking meds, has court-appointed financial guardian and on waiting list for ACT team, and no significant MHx, BIBEMS from home on 6/2 after fall and found to have L intertrochanteric fx now s/p repair. Psych consult called at family request for assistance with med management. On exam, pt presents calm, pleasant, cooperative, awake and alert, but manifests persistent mild grandiose delusions (says she is  to a former doctor of hers, states she has a lot of money and pays "a million dollars a month" for meals on wheels services). Son and LUKE are at bedside, but withdraw for interview at MD request. Pt describes mood as "tired" and denies SI/HI/AVH. Pt reacts dismissively when asked if she has a PHx; she confirms she has been admitted to [facility] but says she has no idea why, adding, "My son told me the hospital needs money so I gave them a million-dollar donation." Pt says she does not take psychiatric medications because she "doesn't believe in them," preferring to take vitamin supplements instead: "I ordered some new pills from [employer] that I heard are very good." Outside pt's room, son and LUKE tell MD that pt has been delusional for years and frequently disturbs her neighbors by yelling and banging doors in her apartment, but otherwise has no h/o self-harm or dangerous behavior. Per son, pt's last IP psych admission was prolonged because she was refusing medications and the hospital was unable to obtain a timely mental health court hearing for involuntary medication due to the pandemic. Son reports that family tried to persuade pt to relocate to a psychiatric assisted living facility ([facility]), but after cooperating with the intake process initially, pt refused to sign the necessary paperwork and never moved into the facility. MD explains to children that in her current state, pt does not appear to be seriously impaired by her delusions and does not appear to present an acute danger to self or others. Accordingly, pt does not meet criteria for involuntary IP psych admission or emergency psychotropic medications at this time, and psychiatry is signing off. Children express understanding.

## 2021-06-03 NOTE — PROGRESS NOTE ADULT - SUBJECTIVE AND OBJECTIVE BOX
Source of information: , Chart review  Patient language: English  : n/a    CC:  left hip pain    This is a 71yFemale patient who presents to the hospital for Patient is a 71y old  Female who presents with a chief complaint of intertrochanteric fracture/ [facility] (2021 09:13)    Pt s/p left hip ORIF, pod#0.  Pt lying in bed, nad.  No complaints of pain at rest.  + pain on exertion.  No nausea or vomiting.  Pt to be oob with PT in am.    PAIN SCORE:   3/10      SCALE USED: (1-10 NRS)      PAST MEDICAL & SURGICAL HISTORY:  Schizoaffective disorder    Bipolar disorder    No significant past surgical history        FAMILY HISTORY:  No pertinent family history in first degree relatives          SOCIAL HISTORY:  [x ] Denies Smoking, Alcohol, or Drug Use    Allergies    No Known Allergies    Intolerances        MEDICATIONS:    MEDICATIONS  (STANDING):  acetaminophen   Tablet .. 1000 milliGRAM(s) Oral every 8 hours  ceFAZolin   IVPB 2000 milliGRAM(s) IV Intermittent every 8 hours  enoxaparin Injectable 30 milliGRAM(s) SubCutaneous every 12 hours  pantoprazole    Tablet 40 milliGRAM(s) Oral before breakfast  polyethylene glycol 3350 17 Gram(s) Oral daily  senna 2 Tablet(s) Oral at bedtime  sodium chloride 0.9%. 1000 milliLiter(s) (125 mL/Hr) IV Continuous <Continuous>  sodium chloride 0.9%. 1000 milliLiter(s) (115 mL/Hr) IV Continuous <Continuous>    MEDICATIONS  (PRN):  oxyCODONE    IR 2.5 milliGRAM(s) Oral every 4 hours PRN Moderate Pain (4 - 6)  oxyCODONE    IR 5 milliGRAM(s) Oral every 4 hours PRN Severe Pain (7 - 10)      Vital Signs Last 24 Hrs  T(C): 36.7 (2021 12:09), Max: 38.2 (2021 20:44)  T(F): 98.1 (2021 12:09), Max: 100.8 (2021 20:44)  HR: 83 (2021 12:09) (83 - 103)  BP: 104/65 (2021 12:09) (100/60 - 145/62)  BP(mean): 72 (2021 11:35) (72 - 99)  RR: 18 (2021 12:09) (10 - 19)  SpO2: 95% (2021 12:09) (92% - 100%)    LABS:                          10.6   13.74 )-----------( 249      ( 2021 11:27 )             32.6     06-03    139  |  107  |  14  ----------------------------<  111<H>  3.9   |  21<L>  |  0.53    Ca    7.8<L>      2021 11:27    TPro  8.1  /  Alb  3.7  /  TBili  0.7  /  DBili  x   /  AST  47<H>  /  ALT  31  /  AlkPhos  96  06-01      LIVER FUNCTIONS - ( 2021 23:21 )  Alb: 3.7 g/dL / Pro: 8.1 g/dL / ALK PHOS: 96 U/L / ALT: 31 U/L DA / AST: 47 U/L / GGT: x           Urinalysis Basic - ( 2021 04:13 )    Color: Yellow / Appearance: Clear / S.025 / pH: x  Gluc: x / Ketone: Trace  / Bili: Negative / Urobili: Negative   Blood: x / Protein: 30 mg/dL / Nitrite: Negative   Leuk Esterase: Moderate / RBC: 2-5 /HPF / WBC 11-25 /HPF   Sq Epi: x / Non Sq Epi: Few /HPF / Bacteria: Moderate /HPF      CAPILLARY BLOOD GLUCOSE          Radiology:    Drug Screen:        REVIEW OF SYSTEMS:  confused    PHYSICAL EXAM:  GENERAL:  Alert & Oriented X1, nad  CHEST/LUNG: Clear to auscultation bilaterally; No rales, rhonchi, wheezing, or rubs  HEART: Regular rate and rhythm; No murmurs, rubs, or gallops  ABDOMEN: Soft, Nontender, Nondistended; Bowel sounds present  EXTREMITIES:  2+ Peripheral Pulses, No cyanosis, or edema  MUSCULOSKELETAL: + decreased rom + left hip tenderness   SKIN: No rashes or lesions    Risk factors associated with adverse outcomes related to opioid treatment  [ ]  Concurrent benzodiazepine use  [ ]  History/ Active substance use or alcohol use disorder  [ ] Psychiatric co-morbidity  [ ] Sleep apnea  [ ] COPD  [ ] BMI> 35  [ ] Liver dysfunction  [ ] Renal dysfunction  [ ] CHF  [ ] Smoker  [x ]  Age > 60 years    [x ]  [place]  Reviewed and Copied to Chart. See below.    Plan of care and goal oriented pain management treatment options were discussed with patient and /or primary care giver; all questions and concerns were addressed and care was aligned with patient's wishes.    Educated patient on goal oriented pain management treatment options     21 @ 14:53

## 2021-06-03 NOTE — BH CONSULTATION LIAISON ASSESSMENT NOTE - OTHER PAST PSYCHIATRIC HISTORY (INCLUDE DETAILS REGARDING ONSET, COURSE OF ILLNESS, INPATIENT/OUTPATIENT TREATMENT)
PHx: Bipolar vs SAD  IP: Multiple, most recent [facility] 4/2021  OP: None currently. On waiting list for ACT team. Has court-appointed legal guardian  Rx: None currently

## 2021-06-03 NOTE — BH CONSULTATION LIAISON ASSESSMENT NOTE - SUMMARY
71F,  and living alone, with PHx of bipolar DO vs SAD with h/o multiple past IP adms (latest 5/2020-7/2020 @[facility]), not currently in tx or taking meds, has court-appointed financial guardian and on waiting list for ACT team, and no significant MHx, BIBEMS from home on 6/2 after fall and found to have L intertrochanteric fx now s/p repair. Psych consult called at family request for assistance with med management. On exam, pt presents calm, pleasant, cooperative, awake and alert, but manifests persistent mild grandiose delusions (says she is  to a former doctor of hers, states she has a lot of money and pays "a million dollars a month" for meals on wheels services). However, the delusions do not appear to be seriously interfering with pt's functioning and she does not appear to present an acute danger to self or others. Pt does not meet criteria for involuntary IP psych admission and does not appear to be in need of emergency psychotropic medications at this time. Psychiatry is signing off.

## 2021-06-03 NOTE — BH CONSULTATION LIAISON ASSESSMENT NOTE - NSBHMSERELATED_PSY_A_CORE
Patient  initially met criteria for severe sepsis w/ leukocytosis, tachycardia, tachypnea and elevated lactate. S/p Ceftazadme 1g IV x1, Vanc 750mg IV x1 in ED. No fluids given due to moderate MR. Now, patient no longer meeting sepsis criteria, as lactate is resolved, and patient is no longer tachypneic or tachycardic, and has been afebrile. ProCal negtive.  - as per pulm recs, will monitor patient clinically and see if she becomes febrile/tachypneic again without antibiotics.   -BCx NGTD, procal negative, mrsa negative.   -s/p cefepime and ceftazidime Patient initially met criteria for severe sepsis w/ leukocytosis, tachycardia, tachypnea and elevated lactate. S/p Ceftazadme 1g IV x1, Vanc 750mg IV x1 in ED. No fluids given due to moderate MR. Now, patient no longer meeting sepsis criteria, as lactate is resolved, and patient is no longer tachypneic or tachycardic, and has been afebrile. ProCal negtive.  -BCx NGTD, procal negative, mrsa negative.   -s/p cefepime and ceftazidime; stopped treatment as pt was stable and asymptomatic  - as per pulm recs, continuing monitoring to see if becomes febrile/tachypneic again without antibiotics Good

## 2021-06-03 NOTE — BH CONSULTATION LIAISON ASSESSMENT NOTE - NSSUICPROTFACT_PSY_ALL_CORE
Responsibility to children, family, or others/Identifies reasons for living/Supportive social network of family or friends/Fear of death or the actual act of killing self/Cultural, spiritual and/or moral attitudes against suicide/Positive therapeutic relationships

## 2021-06-03 NOTE — BH CONSULTATION LIAISON ASSESSMENT NOTE - RISK ASSESSMENT
Risk factors: Older age, delusional disorder, lives alone. Protective factors: Absent h/o SI/SA/SIB, stable domicile, supportive family, has court-appointed legal guardian and is on waiting list for ACT team, attachment to independence. Acute risk level appears low at the time of assessment, but chronic risk may be mildly elevated due to above risk factors.

## 2021-06-03 NOTE — BRIEF OPERATIVE NOTE - NSICDXBRIEFPROCEDURE_GEN_ALL_CORE_FT
PROCEDURES:  Open reduction and internal fixation of left hip using interlocking intramedullary bonnie 03-Jun-2021 10:52:28  Leeroy Begum

## 2021-06-03 NOTE — PROGRESS NOTE ADULT - SUBJECTIVE AND OBJECTIVE BOX
patient unable to provide appropriate history  patient history from family    Vital Signs Last 24 Hrs  T(C): 37.1 (03 Jun 2021 06:37), Max: 38.2 (02 Jun 2021 20:44)  T(F): 98.8 (03 Jun 2021 06:37), Max: 100.8 (02 Jun 2021 20:44)  HR: 89 (03 Jun 2021 06:37) (88 - 103)  BP: 122/77 (03 Jun 2021 06:37) (106/65 - 138/58)  BP(mean): --  RR: 18 (03 Jun 2021 06:37) (17 - 19)  SpO2: 95% (03 Jun 2021 06:37) (92% - 99%)    no acute distressed  no orientation to location and time    pain LLE  skin intact    AP  L IT comminuted fx    site marked  will proceed with CMN today    npo ivf    for or today patient unable to provide appropriate history  patient history from family    Vital Signs Last 24 Hrs  T(C): 37.1 (03 Jun 2021 06:37), Max: 38.2 (02 Jun 2021 20:44)  T(F): 98.8 (03 Jun 2021 06:37), Max: 100.8 (02 Jun 2021 20:44)  HR: 89 (03 Jun 2021 06:37) (88 - 103)  BP: 122/77 (03 Jun 2021 06:37) (106/65 - 138/58)  BP(mean): --  RR: 18 (03 Jun 2021 06:37) (17 - 19)  SpO2: 95% (03 Jun 2021 06:37) (92% - 99%)    no acute distressed  no orientation to location and time    pain LLE  skin intact  compartments soft  LLE shortened and externally rotated  2+pulses nvi  5/5 strength of ehl/ta/gs b/l  no ct calves soft  tender over the fx site left greater troch region    AP  L IT comminuted fx    site marked  will proceed with CMN today  npo ivf  for or today

## 2021-06-03 NOTE — BH CONSULTATION LIAISON ASSESSMENT NOTE - OTHER
No reported h/o violence   Grandiose delusions (she is very wealthy,  to a Dr. Marcus, is somehow connected to Cristian Farias)

## 2021-06-03 NOTE — BH CONSULTATION LIAISON ASSESSMENT NOTE - NSBHCONSULTRECOMMENDOTHER_PSY_A_CORE FT
1. No indication for standing psychotropic medications at this time (pt denies interest)  2. In the event of severe acute agitation, recommend Zyprexa Zydis ODT 5 mg q12h PRN severe agitation  3. Medical management as directed by primary team  4. Psychiatry is signing off. Reconsult if additional issues arise as inpatient  5. Case d/w primary team    Dilcia Tompkins MD  Director, Consultation-Liaison Psychiatry Service  [phone]

## 2021-06-03 NOTE — BH CONSULTATION LIAISON ASSESSMENT NOTE - DETAILS
Has court-appointed legal guardian through [facility] [email or web address]

## 2021-06-03 NOTE — PROGRESS NOTE ADULT - ATTENDING COMMENTS
Patient seen and examined this afternoon on return from OR after surgical repair of fracture sustained after a mechanical fall    Patient comfortable, not agitated, answered questions appropriately and denied having pain.   denies fever, chills, SOB, palpitations, chest pain, nausea, vomiting, diarrhea, constipation, dizziness    Vital Signs Last 24 Hrs  T(C): 37.2 (03 Jun 2021 20:29), Max: 37.8 (02 Jun 2021 23:55)  T(F): 98.9 (03 Jun 2021 20:29), Max: 100 (02 Jun 2021 23:55)  HR: 101 (03 Jun 2021 20:29) (83 - 101)  BP: 97/57 (03 Jun 2021 20:29) (97/57 - 145/62)  BP(mean): 72 (03 Jun 2021 11:35) (72 - 99)  RR: 18 (03 Jun 2021 20:29) (10 - 19)  SpO2: 92% (03 Jun 2021 20:29) (92% - 100%)    P/E:  elderly overweight female, NAD  Neuro: AAO x3; No gross focal deficits  CVS: S1S2 present, regular  Resp: BLAE+, No wheeze or Rhonchi  GI: Soft, BS+, NT  Extr: No edema or calf tenderness    Labs:                        10.6   13.74 )-----------( 249      ( 03 Jun 2021 11:27 )             32.6   06-03    139  |  107  |  14  ----------------------------<  111<H>  3.9   |  21<L>  |  0.53    Ca    7.8<L>      03 Jun 2021 11:27    TPro  8.1  /  Alb  3.7  /  TBili  0.7  /  DBili  x   /  AST  47<H>  /  ALT  31  /  AlkPhos  96  06-01    D/D:  Left Hip intertrochanteric fracture s/p IM nailing POD#0  Acute pain due to fracture and postoperative  At risk for Constipation  Delusional disorder  Hx Bipolar disorder stable    Plan:  Diet as tolerated  Pain control as per Pain mgmt   Bowel regimen   PT evaluation  DVT ppx  Psych eval appreciated primary Delusional disorder not needing any standing meds as per Psych who also spoke with Son and Daughter in law as well as Guardian appt by court  D/C Plan to [facility] Patient seen and examined this afternoon on return from OR after surgical repair of fracture sustained after a mechanical fall    Patient comfortable, not agitated, answered questions appropriately and denied having pain.   denies fever, chills, SOB, palpitations, chest pain, nausea, vomiting, diarrhea, constipation, dizziness    Vital Signs Last 24 Hrs  T(C): 37.2 (03 Jun 2021 20:29), Max: 37.8 (02 Jun 2021 23:55)  T(F): 98.9 (03 Jun 2021 20:29), Max: 100 (02 Jun 2021 23:55)  HR: 101 (03 Jun 2021 20:29) (83 - 101)  BP: 97/57 (03 Jun 2021 20:29) (97/57 - 145/62)  BP(mean): 72 (03 Jun 2021 11:35) (72 - 99)  RR: 18 (03 Jun 2021 20:29) (10 - 19)  SpO2: 92% (03 Jun 2021 20:29) (92% - 100%)    P/E:  elderly overweight female, NAD  Neuro: AAO x3; No gross focal deficits  CVS: S1S2 present, regular  Resp: BLAE+, No wheeze or Rhonchi  GI: Soft, BS+, NT  Extr: No edema or calf tenderness    Labs:                        10.6   13.74 )-----------( 249      ( 03 Jun 2021 11:27 )             32.6   06-03    139  |  107  |  14  ----------------------------<  111<H>  3.9   |  21<L>  |  0.53    Ca    7.8<L>      03 Jun 2021 11:27    TPro  8.1  /  Alb  3.7  /  TBili  0.7  /  DBili  x   /  AST  47<H>  /  ALT  31  /  AlkPhos  96  06-01    D/D:  Left Hip intertrochanteric fracture s/p IM nailing POD#0  Acute pain due to fracture and postoperative  At risk for Constipation  Delusional disorder  Hx Bipolar disorder stable    Plan:  Diet as tolerated  Pain control as per Pain mgmt   Bowel regimen   PT evaluation  DVT ppx  Psych eval appreciated primary Delusional disorder not needing any standing meds as per Psych who also spoke with Son and Daughter in law as well as Guardian appt by court  D/C Plan to [facility]    Discussed with Ortho PA and Pain mgmt and NP Carlotta  Discussed with Patient and DAREK Aldana Patient seen and examined this afternoon on return from OR after surgical repair of fracture sustained after a mechanical fall    Patient comfortable, not agitated, answered questions appropriately and denied having pain.   denies fever, chills, SOB, palpitations, chest pain, nausea, vomiting, diarrhea, constipation, dizziness    Vital Signs Last 24 Hrs  T(C): 37.2 (03 Jun 2021 20:29), Max: 37.8 (02 Jun 2021 23:55)  T(F): 98.9 (03 Jun 2021 20:29), Max: 100 (02 Jun 2021 23:55)  HR: 101 (03 Jun 2021 20:29) (83 - 101)  BP: 97/57 (03 Jun 2021 20:29) (97/57 - 145/62)  BP(mean): 72 (03 Jun 2021 11:35) (72 - 99)  RR: 18 (03 Jun 2021 20:29) (10 - 19)  SpO2: 92% (03 Jun 2021 20:29) (92% - 100%)    P/E:  elderly overweight female, NAD  Neuro: AAO x3; No gross focal deficits  CVS: S1S2 present, regular  Resp: BLAE+, No wheeze or Rhonchi  GI: Soft, BS+, NT  Extr: No edema or calf tenderness    Labs:                        10.6   13.74 )-----------( 249      ( 03 Jun 2021 11:27 )             32.6   06-03    139  |  107  |  14  ----------------------------<  111<H>  3.9   |  21<L>  |  0.53    Ca    7.8<L>      03 Jun 2021 11:27    TPro  8.1  /  Alb  3.7  /  TBili  0.7  /  DBili  x   /  AST  47<H>  /  ALT  31  /  AlkPhos  96  06-01    D/D:  Left Hip intertrochanteric fracture s/p IM nailing POD#0  Acute pain due to fracture and postoperative  Leucocytosis likely reactive postoperative  At risk for Constipation  Delusional disorder  Hx Bipolar disorder stable    Plan:  Diet as tolerated  Pain control as per Pain mgmt   Bowel regimen   PT evaluation  DVT ppx  Psych eval appreciated primary Delusional disorder not needing any standing meds as per Psych who also spoke with Son and Daughter in law as well as Guardian appt by court  D/C Plan to [facility]    Discussed with Ortho PA and Pain mgmt and NP Carlotta  Discussed with Patient and DAREK Aldana

## 2021-06-03 NOTE — PROGRESS NOTE ADULT - PROBLEM SELECTOR PLAN 1
s/p mechanical fall  -HIP X ray shows Impacted intertrochanteric fracture of the left femoral neck  -Ortho following  -Pain mgnt consulted  -Plan for IM nailing today

## 2021-06-03 NOTE — PROGRESS NOTE ADULT - SUBJECTIVE AND OBJECTIVE BOX
NP Note discussed with  Primary Attending    Patient is a 71y old  Female who presents with a chief complaint of intertrochanteric fracture/ GoHCO (2021 07:35)      INTERVAL HPI/OVERNIGHT EVENTS: no new complaints    MEDICATIONS  (STANDING):  acetaminophen   Tablet .. 1000 milliGRAM(s) Oral every 8 hours  ceFAZolin   IVPB 2000 milliGRAM(s) IV Intermittent every 8 hours  enoxaparin Injectable 30 milliGRAM(s) SubCutaneous every 12 hours  polyethylene glycol 3350 17 Gram(s) Oral daily  senna 2 Tablet(s) Oral at bedtime  sodium chloride 0.9%. 1000 milliLiter(s) (125 mL/Hr) IV Continuous <Continuous>  sodium chloride 0.9%. 1000 milliLiter(s) (115 mL/Hr) IV Continuous <Continuous>    MEDICATIONS  (PRN):  oxyCODONE    IR 2.5 milliGRAM(s) Oral every 4 hours PRN Moderate Pain (4 - 6)  oxyCODONE    IR 5 milliGRAM(s) Oral every 4 hours PRN Severe Pain (7 - 10)      __________________________________________________  REVIEW OF SYSTEMS:    CONSTITUTIONAL: No fever,   EYES: no acute visual disturbances  NECK: No pain or stiffness  RESPIRATORY: No cough; No shortness of breath  CARDIOVASCULAR: No chest pain, no palpitations  GASTROINTESTINAL: No pain. No nausea or vomiting; No diarrhea   NEUROLOGICAL: No headache or numbness, no tremors  MUSCULOSKELETAL: No joint pain, no muscle pain  GENITOURINARY: no dysuria, no frequency, no hesitancy  PSYCHIATRY: no depression , no anxiety  ALL OTHER  ROS negative        Vital Signs Last 24 Hrs  T(C): 36.7 (2021 12:09), Max: 38.2 (2021 20:44)  T(F): 98.1 (2021 12:09), Max: 100.8 (2021 20:44)  HR: 83 (2021 12:09) (83 - 103)  BP: 104/65 (2021 12:09) (100/60 - 145/62)  BP(mean): 72 (2021 11:35) (72 - 99)  RR: 18 (2021 12:09) (10 - 19)  SpO2: 95% (2021 12:09) (92% - 100%)    ________________________________________________  PHYSICAL EXAM:  GENERAL: NAD  HEENT: Normocephalic;  conjunctivae and sclerae clear; moist mucous membranes;   NECK : supple  CHEST/LUNG: Clear to auscultation bilaterally with good air entry   HEART: S1 S2  regular; no murmurs, gallops or rubs  ABDOMEN: Soft, Nontender, Nondistended; Bowel sounds present  EXTREMITIES: no cyanosis; no edema; no calf tenderness  SKIN: warm and dry; no rash  NERVOUS SYSTEM:  Awake and alert; Oriented  to place, person and time ; no new deficits    _________________________________________________  LABS:                        10.6   13.74 )-----------( 249      ( 2021 11:27 )             32.6     06-03    139  |  107  |  14  ----------------------------<  111<H>  3.9   |  21<L>  |  0.53    Ca    7.8<L>      2021 11:27    TPro  8.1  /  Alb  3.7  /  TBili  0.7  /  DBili  x   /  AST  47<H>  /  ALT  31  /  AlkPhos  96  06-01      Urinalysis Basic - ( 2021 04:13 )    Color: Yellow / Appearance: Clear / S.025 / pH: x  Gluc: x / Ketone: Trace  / Bili: Negative / Urobili: Negative   Blood: x / Protein: 30 mg/dL / Nitrite: Negative   Leuk Esterase: Moderate / RBC: 2-5 /HPF / WBC 11-25 /HPF   Sq Epi: x / Non Sq Epi: Few /HPF / Bacteria: Moderate /HPF      CAPILLARY BLOOD GLUCOSE            RADIOLOGY & ADDITIONAL TESTS:    Imaging Personally Reviewed:  YES/NO    Consultant(s) Notes Reviewed:   YES/ No    Care Discussed with Consultants :     Plan of care was discussed with patient and /or primary care giver; all questions and concerns were addressed and care was aligned with patient's wishes.     NP Note discussed with  Primary Attending    Patient is a 71y old  Female who presents with a chief complaint of intertrochanteric fracture/ GoHCO (2021 07:35)    INTERVAL HPI/OVERNIGHT EVENTS: Left hip pain with movement       MEDICATIONS  (STANDING):  acetaminophen   Tablet .. 1000 milliGRAM(s) Oral every 8 hours  ceFAZolin   IVPB 2000 milliGRAM(s) IV Intermittent every 8 hours  enoxaparin Injectable 30 milliGRAM(s) SubCutaneous every 12 hours  polyethylene glycol 3350 17 Gram(s) Oral daily  senna 2 Tablet(s) Oral at bedtime  sodium chloride 0.9%. 1000 milliLiter(s) (125 mL/Hr) IV Continuous <Continuous>  sodium chloride 0.9%. 1000 milliLiter(s) (115 mL/Hr) IV Continuous <Continuous>    MEDICATIONS  (PRN):  oxyCODONE    IR 2.5 milliGRAM(s) Oral every 4 hours PRN Moderate Pain (4 - 6)  oxyCODONE    IR 5 milliGRAM(s) Oral every 4 hours PRN Severe Pain (7 - 10)      __________________________________________________  REVIEW OF SYSTEMS:  limited due to mental status       Vital Signs Last 24 Hrs  T(C): 36.7 (2021 12:09), Max: 38.2 (2021 20:44)  T(F): 98.1 (2021 12:09), Max: 100.8 (2021 20:44)  HR: 83 (2021 12:09) (83 - 103)  BP: 104/65 (2021 12:09) (100/60 - 145/62)  BP(mean): 72 (2021 11:35) (72 - 99)  RR: 18 (2021 12:09) (10 - 19)  SpO2: 95% (2021 12:09) (92% - 100%)    ________________________________________________  PHYSICAL EXAM: disoriented, well developed  GENERAL: NAD  HEENT: Normocephalic;  conjunctivae and sclerae clear;  NECK : supple  CHEST/LUNG: Clear to auscultation bilaterally with good air entry   HEART: S1 S2  regular; no murmurs, gallops or rubs  ABDOMEN: Soft, Nontender, Nondistended; Bowel sounds present  EXTREMITIES: no cyanosis; no edema; no calf tenderness Left leg shortened and externally rotated   SKIN: warm and dry; no rash  NERVOUS SYSTEM:  Awake and alert; confused  _________________________________________________  LABS:                        10.6   13.74 )-----------( 249      ( 2021 11:27 )             32.6     06-03    139  |  107  |  14  ----------------------------<  111<H>  3.9   |  21<L>  |  0.53    Ca    7.8<L>      2021 11:27    TPro  8.1  /  Alb  3.7  /  TBili  0.7  /  DBili  x   /  AST  47<H>  /  ALT  31  /  AlkPhos  96  06-      Urinalysis Basic - ( 2021 04:13 )    Color: Yellow / Appearance: Clear / S.025 / pH: x  Gluc: x / Ketone: Trace  / Bili: Negative / Urobili: Negative   Blood: x / Protein: 30 mg/dL / Nitrite: Negative   Leuk Esterase: Moderate / RBC: 2-5 /HPF / WBC 11-25 /HPF   Sq Epi: x / Non Sq Epi: Few /HPF / Bacteria: Moderate /HPF      CAPILLARY BLOOD GLUCOSE            RADIOLOGY & ADDITIONAL TESTS:   < from: Xray Femur 2 Views, Left (21 @ 09:24) >    INTERPRETATION:  Left hip with full pelvic view and left femur. Patient had a fall with local trauma.    Left hip with pelvis. 3 views.    Hips are relatively free of degeneration.    There is a left hip intertrochanteric fracture with comminution of the lesser trochanter and increased angulation at the fracture site.    Left femur. Previews.    Lower femur intact. On these views the angulation at the fracture has improved.    IMPRESSION: Left hip fracture as above.    < end of copied text >  < from: Xray Hip w/ Pelvis 2 or 3 Views, Left (21 @ 02:37) >    INTERPRETATION:  Left hip with full pelvic view and left femur. Patient had a fall with local trauma.    Left hip with pelvis. 3 views.    Hips are relatively free of degeneration.    There is a left hip intertrochanteric fracture with comminution of the lesser trochanter and increased angulation at the fracture site.    Left femur. Previews.    Lower femur intact. On these views the angulation at the fracture has improved.    IMPRESSION: Left hip fracture as above.    < end of copied text >    Imaging Personally Reviewed:  YES    Plan of care was discussed with patient and /or primary care giver; all questions and concerns were addressed and care was aligned with patient's wishes.

## 2021-06-03 NOTE — BH CONSULTATION LIAISON ASSESSMENT NOTE - DESCRIPTION
< from: Xray Femur 2 Views, Left (06.02.21 @ 09:24) >    INTERPRETATION:  Left hip with full pelvic view and left femur. Patient had a fall with local trauma.    Left hip with pelvis. 3 views.    Hips are relatively free of degeneration.    There is a left hip intertrochanteric fracture with comminution of the lesser trochanter and increased angulation at the fracture site.    Left femur. Previews.    Lower femur intact. On these views the angulation at the fracture has improved.    < end of copied text >  < from: 12 Lead ECG (06.01.21 @ 22:57) >    QTC Calculation(Bazett) 627 ms    P Axis 59 degrees    R Axis -11 degrees    T Axis 49 degrees    Diagnosis Line Sinus tachycardia  Prolonged QT  Abnormal ECG    < end of copied text >

## 2021-06-03 NOTE — BH CONSULTATION LIAISON ASSESSMENT NOTE - NSBHCHARTREVIEWVS_PSY_A_CORE FT
Vital Signs Last 24 Hrs  T(C): 36.7 (03 Jun 2021 12:09), Max: 38.2 (02 Jun 2021 20:44)  T(F): 98.1 (03 Jun 2021 12:09), Max: 100.8 (02 Jun 2021 20:44)  HR: 83 (03 Jun 2021 12:09) (83 - 103)  BP: 104/65 (03 Jun 2021 12:09) (100/60 - 145/62)  BP(mean): 72 (03 Jun 2021 11:35) (72 - 99)  RR: 18 (03 Jun 2021 12:09) (10 - 19)  SpO2: 95% (03 Jun 2021 12:09) (92% - 100%)

## 2021-06-03 NOTE — BH CONSULTATION LIAISON ASSESSMENT NOTE - NSBHCHARTREVIEWLAB_PSY_A_CORE FT
10.6   13.74 )-----------( 249      ( 03 Jun 2021 11:27 )             32.6   06-03    139  |  107  |  14  ----------------------------<  111<H>  3.9   |  21<L>  |  0.53    Ca    7.8<L>      03 Jun 2021 11:27    TPro  8.1  /  Alb  3.7  /  TBili  0.7  /  DBili  x   /  AST  47<H>  /  ALT  31  /  AlkPhos  96  06-01

## 2021-06-03 NOTE — PROGRESS NOTE ADULT - PROBLEM SELECTOR PLAN 1
Acute hip pain, left. Recommendation: [facility] Pt s/p left hip orif, pod#0  For OR today.  Can keep on morphine iv prn.    High risk medications reviewed. Avoid polypharmacy. Avoid IV opioids. Avoid NSAIDs and benzodiazepines. Non-pharmacological sleep aides initiated. Non-opioid medications and non-pharmacological pain management measures initiated.   Routine Meds  - Acetaminophen 1 gram po three times day for 4 days  Mild Pain ( Pain Level - 1-3)  - Non - Pharmacological Measures  Moderate Pain (Pain Level 4-6)  - Oxycodone 2.5mg po q 4 hours prn  Severe Pain ( Pain Level - 7-10)  - Oxycodone 5mg po q 4 hours prn  Bowel Regimen   - Senna and Miralax  OOB/PT.

## 2021-06-03 NOTE — BH CONSULTATION LIAISON ASSESSMENT NOTE - CURRENT MEDICATION
MEDICATIONS  (STANDING):  acetaminophen   Tablet .. 1000 milliGRAM(s) Oral every 8 hours  ceFAZolin   IVPB 2000 milliGRAM(s) IV Intermittent every 8 hours  enoxaparin Injectable 30 milliGRAM(s) SubCutaneous every 12 hours  pantoprazole    Tablet 40 milliGRAM(s) Oral before breakfast  polyethylene glycol 3350 17 Gram(s) Oral daily  senna 2 Tablet(s) Oral at bedtime  sodium chloride 0.9%. 1000 milliLiter(s) (125 mL/Hr) IV Continuous <Continuous>  sodium chloride 0.9%. 1000 milliLiter(s) (115 mL/Hr) IV Continuous <Continuous>    MEDICATIONS  (PRN):  oxyCODONE    IR 2.5 milliGRAM(s) Oral every 4 hours PRN Moderate Pain (4 - 6)  oxyCODONE    IR 5 milliGRAM(s) Oral every 4 hours PRN Severe Pain (7 - 10)

## 2021-06-04 ENCOUNTER — TRANSCRIPTION ENCOUNTER (OUTPATIENT)
Age: 71
End: 2021-06-04

## 2021-06-04 LAB
A1C WITH ESTIMATED AVERAGE GLUCOSE RESULT: 5.3 % — SIGNIFICANT CHANGE UP (ref 4–5.6)
ALBUMIN SERPL ELPH-MCNC: 2.6 G/DL — LOW (ref 3.5–5)
ALP SERPL-CCNC: 56 U/L — SIGNIFICANT CHANGE UP (ref 40–120)
ALT FLD-CCNC: 35 U/L DA — SIGNIFICANT CHANGE UP (ref 10–60)
ANION GAP SERPL CALC-SCNC: 6 MMOL/L — SIGNIFICANT CHANGE UP (ref 5–17)
APPEARANCE UR: CLEAR — SIGNIFICANT CHANGE UP
AST SERPL-CCNC: 50 U/L — HIGH (ref 10–40)
BACTERIA # UR AUTO: ABNORMAL /HPF
BASOPHILS # BLD AUTO: 0.02 K/UL — SIGNIFICANT CHANGE UP (ref 0–0.2)
BASOPHILS NFR BLD AUTO: 0.2 % — SIGNIFICANT CHANGE UP (ref 0–2)
BILIRUB SERPL-MCNC: 0.3 MG/DL — SIGNIFICANT CHANGE UP (ref 0.2–1.2)
BILIRUB UR-MCNC: NEGATIVE — SIGNIFICANT CHANGE UP
BUN SERPL-MCNC: 20 MG/DL — HIGH (ref 7–18)
CALCIUM SERPL-MCNC: 8.1 MG/DL — LOW (ref 8.4–10.5)
CHLORIDE SERPL-SCNC: 107 MMOL/L — SIGNIFICANT CHANGE UP (ref 96–108)
CO2 SERPL-SCNC: 25 MMOL/L — SIGNIFICANT CHANGE UP (ref 22–31)
COLOR SPEC: YELLOW — SIGNIFICANT CHANGE UP
CREAT SERPL-MCNC: 0.58 MG/DL — SIGNIFICANT CHANGE UP (ref 0.5–1.3)
DIFF PNL FLD: ABNORMAL
EOSINOPHIL # BLD AUTO: 0.02 K/UL — SIGNIFICANT CHANGE UP (ref 0–0.5)
EOSINOPHIL NFR BLD AUTO: 0.2 % — SIGNIFICANT CHANGE UP (ref 0–6)
EPI CELLS # UR: SIGNIFICANT CHANGE UP /HPF
ESTIMATED AVERAGE GLUCOSE: 105 MG/DL — SIGNIFICANT CHANGE UP (ref 68–114)
GLUCOSE SERPL-MCNC: 103 MG/DL — HIGH (ref 70–99)
GLUCOSE UR QL: NEGATIVE — SIGNIFICANT CHANGE UP
HCT VFR BLD CALC: 28.4 % — LOW (ref 34.5–45)
HGB BLD-MCNC: 9.5 G/DL — LOW (ref 11.5–15.5)
HYALINE CASTS # UR AUTO: ABNORMAL /LPF
IMM GRANULOCYTES NFR BLD AUTO: 0.6 % — SIGNIFICANT CHANGE UP (ref 0–1.5)
KETONES UR-MCNC: NEGATIVE — SIGNIFICANT CHANGE UP
LEUKOCYTE ESTERASE UR-ACNC: ABNORMAL
LYMPHOCYTES # BLD AUTO: 2.78 K/UL — SIGNIFICANT CHANGE UP (ref 1–3.3)
LYMPHOCYTES # BLD AUTO: 22 % — SIGNIFICANT CHANGE UP (ref 13–44)
MCHC RBC-ENTMCNC: 30.6 PG — SIGNIFICANT CHANGE UP (ref 27–34)
MCHC RBC-ENTMCNC: 33.5 GM/DL — SIGNIFICANT CHANGE UP (ref 32–36)
MCV RBC AUTO: 91.6 FL — SIGNIFICANT CHANGE UP (ref 80–100)
MONOCYTES # BLD AUTO: 1.29 K/UL — HIGH (ref 0–0.9)
MONOCYTES NFR BLD AUTO: 10.2 % — SIGNIFICANT CHANGE UP (ref 2–14)
NEUTROPHILS # BLD AUTO: 8.45 K/UL — HIGH (ref 1.8–7.4)
NEUTROPHILS NFR BLD AUTO: 66.8 % — SIGNIFICANT CHANGE UP (ref 43–77)
NITRITE UR-MCNC: NEGATIVE — SIGNIFICANT CHANGE UP
NRBC # BLD: 0 /100 WBCS — SIGNIFICANT CHANGE UP (ref 0–0)
PH UR: 6 — SIGNIFICANT CHANGE UP (ref 5–8)
PLATELET # BLD AUTO: 239 K/UL — SIGNIFICANT CHANGE UP (ref 150–400)
POTASSIUM SERPL-MCNC: 3.7 MMOL/L — SIGNIFICANT CHANGE UP (ref 3.5–5.3)
POTASSIUM SERPL-SCNC: 3.7 MMOL/L — SIGNIFICANT CHANGE UP (ref 3.5–5.3)
PROT SERPL-MCNC: 6.1 G/DL — SIGNIFICANT CHANGE UP (ref 6–8.3)
PROT UR-MCNC: 15
RBC # BLD: 3.1 M/UL — LOW (ref 3.8–5.2)
RBC # FLD: 13.4 % — SIGNIFICANT CHANGE UP (ref 10.3–14.5)
RBC CASTS # UR COMP ASSIST: ABNORMAL /HPF (ref 0–2)
SODIUM SERPL-SCNC: 138 MMOL/L — SIGNIFICANT CHANGE UP (ref 135–145)
SP GR SPEC: 1.01 — SIGNIFICANT CHANGE UP (ref 1.01–1.02)
UROBILINOGEN FLD QL: NEGATIVE — SIGNIFICANT CHANGE UP
WBC # BLD: 12.63 K/UL — HIGH (ref 3.8–10.5)
WBC # FLD AUTO: 12.63 K/UL — HIGH (ref 3.8–10.5)
WBC UR QL: SIGNIFICANT CHANGE UP /HPF (ref 0–5)

## 2021-06-04 PROCEDURE — 99231 SBSQ HOSP IP/OBS SF/LOW 25: CPT

## 2021-06-04 PROCEDURE — 99233 SBSQ HOSP IP/OBS HIGH 50: CPT

## 2021-06-04 RX ORDER — POLYETHYLENE GLYCOL 3350 17 G/17G
17 POWDER, FOR SOLUTION ORAL
Qty: 0 | Refills: 0 | DISCHARGE

## 2021-06-04 RX ORDER — ENOXAPARIN SODIUM 100 MG/ML
30 INJECTION SUBCUTANEOUS
Qty: 0 | Refills: 0 | DISCHARGE
Start: 2021-06-04 | End: 2021-06-17

## 2021-06-04 RX ORDER — PANTOPRAZOLE SODIUM 20 MG/1
1 TABLET, DELAYED RELEASE ORAL
Qty: 0 | Refills: 0 | DISCHARGE

## 2021-06-04 RX ORDER — GABAPENTIN 400 MG/1
1 CAPSULE ORAL
Qty: 0 | Refills: 0 | DISCHARGE
End: 2021-06-11

## 2021-06-04 RX ORDER — SENNA PLUS 8.6 MG/1
1 TABLET ORAL
Qty: 0 | Refills: 0 | DISCHARGE

## 2021-06-04 RX ORDER — OXYCODONE HYDROCHLORIDE 5 MG/1
0.5 TABLET ORAL
Qty: 0 | Refills: 0 | DISCHARGE

## 2021-06-04 RX ADMIN — Medication 1000 MILLIGRAM(S): at 15:22

## 2021-06-04 RX ADMIN — Medication 1000 MILLIGRAM(S): at 15:44

## 2021-06-04 RX ADMIN — Medication 100 MILLIGRAM(S): at 01:04

## 2021-06-04 RX ADMIN — Medication 1000 MILLIGRAM(S): at 22:34

## 2021-06-04 RX ADMIN — Medication 1000 MILLIGRAM(S): at 06:29

## 2021-06-04 RX ADMIN — OXYCODONE HYDROCHLORIDE 5 MILLIGRAM(S): 5 TABLET ORAL at 10:37

## 2021-06-04 RX ADMIN — ENOXAPARIN SODIUM 30 MILLIGRAM(S): 100 INJECTION SUBCUTANEOUS at 22:06

## 2021-06-04 RX ADMIN — SENNA PLUS 2 TABLET(S): 8.6 TABLET ORAL at 22:04

## 2021-06-04 RX ADMIN — Medication 1000 MILLIGRAM(S): at 05:16

## 2021-06-04 RX ADMIN — PANTOPRAZOLE SODIUM 40 MILLIGRAM(S): 20 TABLET, DELAYED RELEASE ORAL at 05:16

## 2021-06-04 RX ADMIN — Medication 1000 MILLIGRAM(S): at 22:04

## 2021-06-04 RX ADMIN — POLYETHYLENE GLYCOL 3350 17 GRAM(S): 17 POWDER, FOR SOLUTION ORAL at 12:37

## 2021-06-04 RX ADMIN — ENOXAPARIN SODIUM 30 MILLIGRAM(S): 100 INJECTION SUBCUTANEOUS at 12:36

## 2021-06-04 RX ADMIN — OXYCODONE HYDROCHLORIDE 5 MILLIGRAM(S): 5 TABLET ORAL at 10:11

## 2021-06-04 NOTE — PROGRESS NOTE ADULT - ATTENDING COMMENTS
Patient seen and examined this afternoon around 12.30 PM    Patient with hx Bipolar disorder not on any home meds admitted s/p Fall with left hip fracture s/p repair yesterday.     Patient comfortable, OOB to chair eating lunch  denies fever, chills, SOB, palpitations, chest pain, nausea, vomiting, diarrhea, constipation, dizziness    Vital Signs Last 24 Hrs  T(C): 37.1 (04 Jun 2021 14:25), Max: 37.2 (03 Jun 2021 20:29)  T(F): 98.8 (04 Jun 2021 14:25), Max: 98.9 (03 Jun 2021 20:29)  HR: 100 (04 Jun 2021 14:25) (85 - 103)  BP: 101/59 (04 Jun 2021 14:25) (97/57 - 139/59)  BP(mean): 70 (04 Jun 2021 11:08) (70 - 70)  RR: 16 (04 Jun 2021 14:25) (16 - 18)  SpO2: 98% (04 Jun 2021 14:25) (92% - 98%)    P/E:  elderly overweight female, NAD  Neuro: AAO x3; flight of speech and delusuions; not agitated  CVS: S1S2 present, regular  Resp: BLAE+, No wheeze or Rhonchi  GI: Soft, BS+, NT  Extr: No edema or calf tenderness  Left hip tenderness sx site    Labs:                        9.5    12.63 )-----------( 239      ( 04 Jun 2021 06:26 )             28.4   06-04    138  |  107  |  20<H>  ----------------------------<  103<H>  3.7   |  25  |  0.58    Ca    8.1<L>      04 Jun 2021 06:26    TPro  6.1  /  Alb  2.6<L>  /  TBili  0.3  /  DBili  x   /  AST  50<H>  /  ALT  35  /  AlkPhos  56  06-04      D/D:  Left Hip intertrochanteric fracture s/p IM nailing POD#0  Acute pain due to fracture and postoperative  Leucocytosis likely reactive postoperative  At risk for Constipation  Delusional disorder  Hx Bipolar disorder stable    Plan:  Diet as tolerated  Pain control as per Pain mgmt   Bowel regimen   PT evaluation  DVT ppx  Psych eval appreciated primary Delusional disorder not needing any standing meds as per Psych who also spoke with Son and Daughter in law as well as Guardian appt by court  D/C Plan to [facility] [place] tomorrow    Discussed with Ortho PA and Pain mgmt and NP Carlotta  Discussed with Patient and DAREK Aldana Patient seen and examined this afternoon around 12.30 PM    Patient with hx Bipolar disorder not on any home meds admitted s/p Fall with left hip fracture s/p repair yesterday.     Patient comfortable, OOB to chair eating lunch  denies fever, chills, SOB, palpitations, chest pain, nausea, vomiting, diarrhea, constipation, dizziness    Vital Signs Last 24 Hrs  T(C): 37.1 (04 Jun 2021 14:25), Max: 37.2 (03 Jun 2021 20:29)  T(F): 98.8 (04 Jun 2021 14:25), Max: 98.9 (03 Jun 2021 20:29)  HR: 100 (04 Jun 2021 14:25) (85 - 103)  BP: 101/59 (04 Jun 2021 14:25) (97/57 - 139/59)  BP(mean): 70 (04 Jun 2021 11:08) (70 - 70)  RR: 16 (04 Jun 2021 14:25) (16 - 18)  SpO2: 98% (04 Jun 2021 14:25) (92% - 98%)    P/E:  elderly overweight female, NAD  Neuro: AAO x3; flight of speech and delusions; not agitated  CVS: S1S2 present, regular  Resp: BLAE+, No wheeze or Rhonchi  GI: Soft, BS+, NT  Extr: No edema or calf tenderness  Left hip tenderness sx site    Labs:                      9.5    12.63 )-----------( 239      ( 04 Jun 2021 06:26 )             28.4   06-04    138  |  107  |  20<H>  ----------------------------<  103<H>  3.7   |  25  |  0.58    Ca    8.1<L>      04 Jun 2021 06:26    TPro  6.1  /  Alb  2.6<L>  /  TBili  0.3  /  DBili  x   /  AST  50<H>  /  ALT  35  /  AlkPhos  56  06-04    D/D:  Left Hip intertrochanteric fracture s/p IM nailing POD#1  Acute pain due to fracture and postoperative  Leucocytosis likely reactive postoperative  At risk for Constipation  Delusional disorder  Hx Bipolar disorder stable    Plan:  Diet as tolerated  Pain control as per Pain mgmt   Bowel regimen   PT evaluation  DVT ppx  Psych eval appreciated primary Delusional disorder not needing any standing meds as per Psych who also spoke with Son and Daughter in law as well as Guardian appt by court  D/C Plan to [facility] at [place] tomorrow    Discussed with Ortho PA and Pain mgmt and NP Carlotta  Discussed with Patient and RN

## 2021-06-04 NOTE — PROGRESS NOTE ADULT - SUBJECTIVE AND OBJECTIVE BOX
Source of information: , Chart review  Patient language: English  : n/a    CC:  left hip pain    This is a 71yFemale patient who presents to the hospital for Patient is a 71y old  Female who presents with a chief complaint of intertrochanteric fracture/ GoHCO (04 Jun 2021 08:30)    Pt s/p left hip IM nail, pod#1.  + mild left hip pain.  Pain on exertion.  No nausea or vomiting.  Pt to be oob with PT.  Pt is confused.      PAIN SCORE:   3/10    SCALE USED: (1-10 NRS)      PAST MEDICAL & SURGICAL HISTORY:  Schizoaffective disorder    Bipolar disorder    No significant past surgical history        FAMILY HISTORY:  No pertinent family history in first degree relatives          SOCIAL HISTORY:  [x ] Denies Smoking, Alcohol, or Drug Use    Allergies    No Known Allergies    Intolerances        MEDICATIONS:    MEDICATIONS  (STANDING):  acetaminophen   Tablet .. 1000 milliGRAM(s) Oral every 8 hours  enoxaparin Injectable 30 milliGRAM(s) SubCutaneous every 12 hours  pantoprazole    Tablet 40 milliGRAM(s) Oral before breakfast  polyethylene glycol 3350 17 Gram(s) Oral daily  senna 2 Tablet(s) Oral at bedtime  sodium chloride 0.9%. 1000 milliLiter(s) (125 mL/Hr) IV Continuous <Continuous>  sodium chloride 0.9%. 1000 milliLiter(s) (115 mL/Hr) IV Continuous <Continuous>    MEDICATIONS  (PRN):  benzocaine 15 mG/menthol 3.6 mG (Sugar-Free) Lozenge 1 Lozenge Oral every 4 hours PRN Sore Throat  oxyCODONE    IR 2.5 milliGRAM(s) Oral every 4 hours PRN Moderate Pain (4 - 6)  oxyCODONE    IR 5 milliGRAM(s) Oral every 4 hours PRN Severe Pain (7 - 10)      Vital Signs Last 24 Hrs  T(C): 36.7 (04 Jun 2021 05:18), Max: 37.2 (03 Jun 2021 20:29)  T(F): 98 (04 Jun 2021 05:18), Max: 98.9 (03 Jun 2021 20:29)  HR: 93 (04 Jun 2021 05:18) (83 - 101)  BP: 128/80 (04 Jun 2021 05:18) (97/57 - 145/62)  BP(mean): 72 (03 Jun 2021 11:35) (72 - 99)  RR: 17 (04 Jun 2021 05:18) (10 - 19)  SpO2: 93% (04 Jun 2021 05:18) (92% - 100%)    LABS:                          9.5    12.63 )-----------( 239      ( 04 Jun 2021 06:26 )             28.4     06-04    138  |  107  |  20<H>  ----------------------------<  103<H>  3.7   |  25  |  0.58    Ca    8.1<L>      04 Jun 2021 06:26    TPro  6.1  /  Alb  2.6<L>  /  TBili  0.3  /  DBili  x   /  AST  50<H>  /  ALT  35  /  AlkPhos  56  06-04      LIVER FUNCTIONS - ( 04 Jun 2021 06:26 )  Alb: 2.6 g/dL / Pro: 6.1 g/dL / ALK PHOS: 56 U/L / ALT: 35 U/L DA / AST: 50 U/L / GGT: x           Urinalysis Basic - ( 04 Jun 2021 04:12 )    Color: x / Appearance: x / SG: x / pH: x  Gluc: x / Ketone: x  / Bili: x / Urobili: x   Blood: x / Protein: x / Nitrite: x   Leuk Esterase: x / RBC: 2-5 /HPF / WBC 3-5 /HPF   Sq Epi: x / Non Sq Epi: Few /HPF / Bacteria: Moderate /HPF      CAPILLARY BLOOD GLUCOSE      POCT Blood Glucose.: 107 mg/dL (04 Jun 2021 08:13)      Radiology:    Drug Screen:        REVIEW OF SYSTEMS:  confused     PHYSICAL EXAM:  GENERAL:  Alert & Oriented X0  CHEST/LUNG: Clear to auscultation bilaterally; No rales, rhonchi, wheezing, or rubs  HEART: Regular rate and rhythm; No murmurs, rubs, or gallops  ABDOMEN: Soft, Nontender, Nondistended; Bowel sounds present  EXTREMITIES:  2+ Peripheral Pulses, No cyanosis, or edema  MUSCULOSKELETAL: + decreased rom + left hip tenderness   SKIN: No rashes or lesions    Risk factors associated with adverse outcomes related to opioid treatment  [ ]  Concurrent benzodiazepine use  [ ]  History/ Active substance use or alcohol use disorder  [ ] Psychiatric co-morbidity  [ ] Sleep apnea  [ ] COPD  [ ] BMI> 35  [ ] Liver dysfunction  [ ] Renal dysfunction  [ ] CHF  [ ] Smoker  [ x]  Age > 60 years    [ x]  [place]  Reviewed and Copied to Chart. See below.    Plan of care and goal oriented pain management treatment options were discussed with patient and /or primary care giver; all questions and concerns were addressed and care was aligned with patient's wishes.    Educated patient on goal oriented pain management treatment options     06-04-21 @ 10:30

## 2021-06-04 NOTE — PHYSICAL THERAPY INITIAL EVALUATION ADULT - TRANSFER SAFETY CONCERNS NOTED: SIT/STAND, REHAB EVAL
losing balance/decreased step length/inability to maintain weight-bearing restrictions w/o assist/decreased weight-shifting ability

## 2021-06-04 NOTE — PROGRESS NOTE ADULT - ASSESSMENT
72 y/o female from home lives alone with PMH schizoaffective disorder (paranoid schizophrenia) not currently on psych meds and no other known comorbidities. BIBEMS after having an episode of mechanical fall, admitted to [facility] intertrochanteric fracture of the left femoral neck. ([facility]). S/P IM nailing 6/3, PT eval reccs [facility].   Found with positive UA+ UCX with 3+ organisms likely contaminated- s/p Ceftriaxone.  psych was consulted but signed off and found no indication for standing psychotropic medications at this time (pt denies interest). Pending [facility] placement, family and guardian made aware of plan

## 2021-06-04 NOTE — PROGRESS NOTE ADULT - PROBLEM SELECTOR PLAN 4
not on any medication at home   refuses o/p psych f/u or meds   psych consulted for reccs
not on any medication at home   refuses o/p psych f/u or meds   psych consulted and signed off: No need psychotropic medications at this time
not on any medication at home   currently stable
not on any medication at home   currently stable

## 2021-06-04 NOTE — PROGRESS NOTE ADULT - PROBLEM SELECTOR PLAN 7
Full code   has a court/state appointed guardian, Sharita (c:[phone] or office: [phone]) from [facility].
Full code   has a court/state appointed guardian, Sharita (c:[phone] or office: [phone]) from [facility].

## 2021-06-04 NOTE — PROGRESS NOTE ADULT - SUBJECTIVE AND OBJECTIVE BOX
NP Note discussed with  Primary Attending    Patient is a 71y old  Female who presents with a chief complaint of intertrochanteric fracture/ GoHCO (04 Jun 2021 12:17)    INTERVAL HPI/OVERNIGHT EVENTS: no acute medicine complaints     MEDICATIONS  (STANDING):  acetaminophen   Tablet .. 1000 milliGRAM(s) Oral every 8 hours  enoxaparin Injectable 30 milliGRAM(s) SubCutaneous every 12 hours  pantoprazole    Tablet 40 milliGRAM(s) Oral before breakfast  polyethylene glycol 3350 17 Gram(s) Oral daily  senna 2 Tablet(s) Oral at bedtime  sodium chloride 0.9%. 1000 milliLiter(s) (125 mL/Hr) IV Continuous <Continuous>  sodium chloride 0.9%. 1000 milliLiter(s) (115 mL/Hr) IV Continuous <Continuous>    MEDICATIONS  (PRN):  benzocaine 15 mG/menthol 3.6 mG (Sugar-Free) Lozenge 1 Lozenge Oral every 4 hours PRN Sore Throat  oxyCODONE    IR 2.5 milliGRAM(s) Oral every 4 hours PRN Moderate Pain (4 - 6)  oxyCODONE    IR 5 milliGRAM(s) Oral every 4 hours PRN Severe Pain (7 - 10)    __________________________________________________  REVIEW OF SYSTEMS:  limited due to mental status     Vital Signs Last 24 Hrs  T(C): 36.7 (04 Jun 2021 05:18), Max: 37.2 (03 Jun 2021 20:29)  T(F): 98 (04 Jun 2021 05:18), Max: 98.9 (03 Jun 2021 20:29)  HR: 103 (04 Jun 2021 11:09) (85 - 103)  BP: 139/59 (04 Jun 2021 11:09) (97/57 - 139/59)  BP(mean): 70 (04 Jun 2021 11:08) (70 - 70)  RR: 17 (04 Jun 2021 05:18) (17 - 18)  SpO2: 95% (04 Jun 2021 11:09) (92% - 98%)    ________________________________________________  PHYSICAL EXAM:    General: AAOx2, in NAD, resting comfortably in bed.  Left hip:  Skin pink, warm LLE.  In nl. alignment.   Dressing is C/D/I.    No ct, calves soft  2+pulses  NVI silt  5/5 strength ehl/ta/gs b/l.  : Amita cath   _________________________________________________  LABS:                        9.5    12.63 )-----------( 239      ( 04 Jun 2021 06:26 )             28.4     06-04    138  |  107  |  20<H>  ----------------------------<  103<H>  3.7   |  25  |  0.58    Ca    8.1<L>      04 Jun 2021 06:26    TPro  6.1  /  Alb  2.6<L>  /  TBili  0.3  /  DBili  x   /  AST  50<H>  /  ALT  35  /  AlkPhos  56  06-04      Urinalysis Basic - ( 04 Jun 2021 04:12 )    Color: x / Appearance: x / SG: x / pH: x  Gluc: x / Ketone: x  / Bili: x / Urobili: x   Blood: x / Protein: x / Nitrite: x   Leuk Esterase: x / RBC: 2-5 /HPF / WBC 3-5 /HPF   Sq Epi: x / Non Sq Epi: Few /HPF / Bacteria: Moderate /HPF      CAPILLARY BLOOD GLUCOSE      POCT Blood Glucose.: 107 mg/dL (04 Jun 2021 08:13)      Imaging Personally Reviewed:  YES    Consultant(s) Notes Reviewed:   YES    Plan of care was discussed with patient and /or primary care giver; all questions and concerns were addressed and care was aligned with patient's wishes.

## 2021-06-04 NOTE — DISCHARGE NOTE PROVIDER - NSDCCPCAREPLAN_GEN_ALL_CORE_FT
PRINCIPAL DISCHARGE DIAGNOSIS  Diagnosis: Closed fracture of left hip, initial encounter  Assessment and Plan of Treatment: Pain Management  DVT Prophylaxis with Venodynes & Lovenox  D/c Staples on 6/16/21  D/c Lovenox on 6/16/21  Daily PT- WBAT with walker LLE  Follow-Up with Orthopedic Surgeon after Rehab   Follow up with Dr Richard in TWO- THREE WEEKS at [phone]      SECONDARY DISCHARGE DIAGNOSES  Diagnosis: UTI (urinary tract infection)  Assessment and Plan of Treatment:      PRINCIPAL DISCHARGE DIAGNOSIS  Diagnosis: Closed fracture of left hip, initial encounter  Assessment and Plan of Treatment: Pain Management  DVT Prophylaxis with Venodynes & Lovenox  D/c Staples on 6/16/21  D/c Lovenox on 6/16/21  Daily PT- WBAT with walker LLE  Follow-Up with Orthopedic Surgeon after Rehab   Follow up with Dr Richard in TWO- THREE WEEKS at [phone]      SECONDARY DISCHARGE DIAGNOSES  Diagnosis: Schizoaffective disorder  Assessment and Plan of Treatment: Make sure to follow up with your outpatient psychiatrist or your can follow up at  the [facility] located at [address] [place] [address] [phone].  To follow up with [facility], call [phone] to make an appointment.    Diagnosis: UTI (urinary tract infection)  Assessment and Plan of Treatment: You treated with antibiotic for a urinary tract infection. Report any signs and symptoms of infection, such as fever or chills, burning/pain with urination, urinary frequency/hesitancy, cloudy or bloody urine, foul smelling urine, back or flank pain     PRINCIPAL DISCHARGE DIAGNOSIS  Diagnosis: Closed fracture of left hip, initial encounter  Assessment and Plan of Treatment: Pain Management  DVT Prophylaxis with Venodynes & Lovenox  D/c Staples on 6/16/21  D/c Lovenox on 6/16/21  Daily PT- WBAT with walker LLE  Follow-Up with Orthopedic Surgeon after Rehab   Follow up with Dr Richard in TWO- THREE WEEKS at [phone]      SECONDARY DISCHARGE DIAGNOSES  Diagnosis: Schizoaffective disorder  Assessment and Plan of Treatment: Make sure to follow up with your outpatient psychiatrist or your can follow up at  the [facility] located at [address] [place] [address] [phone].  To follow up with [facility], call  to make an appointment.    Diagnosis: UTI (urinary tract infection)  Assessment and Plan of Treatment: You treated with antibiotic for a urinary tract infection. Report any signs and symptoms of infection, such as fever or chills, burning/pain with urination, urinary frequency/hesitancy, cloudy or bloody urine, foul smelling urine, back or flank pain     PRINCIPAL DISCHARGE DIAGNOSIS  Diagnosis: Closed fracture of left hip, initial encounter  Assessment and Plan of Treatment: Pain Management  DVT Prophylaxis with Venodynes & Lovenox  D/c Staples on 6/16/21  D/c Lovenox on 6/16/21  Daily PT- WBAT with walker LLE  Follow-Up with Orthopedic Surgeon after Rehab   Follow up with Dr Richard in TWO- THREE WEEKS at [phone]      SECONDARY DISCHARGE DIAGNOSES  Diagnosis: Schizoaffective disorder  Assessment and Plan of Treatment: Make sure to follow up with your outpatient psychiatrist or your can follow up at  the [facility] located at [address] [place] [phone].  To follow up with [facility], call  to make an appointment.    Diagnosis: Rhabdomyolysis  Assessment and Plan of Treatment: You were diagnosed with a mild case of rhabdomyolysis which is from muscle breakdown.  Report any symptoms of unexplainable muscle pain or pain that is out of proportion despite taking pain medication, changes in the color of your urination, coca cola colored urine or bloody urine.    Diagnosis: UTI (urinary tract infection)  Assessment and Plan of Treatment: You treated with antibiotic for a urinary tract infection. Report any signs and symptoms of infection, such as fever or chills, burning/pain with urination, urinary frequency/hesitancy, cloudy or bloody urine, foul smelling urine, back or flank pain    Diagnosis: Hyperglycemia  Assessment and Plan of Treatment: Your blood sugar was slightly elevated during your initial admission and self corrected.   Your A1c was 5.3 which does not suggest diabetes at this time.  However, make sure to report any signs and symptoms of hypo and hyperglycemia to your doctor such as dizziness, lightheadedness, increasing thirst, appetite and increase urination, cold, clammy skin.       PRINCIPAL DISCHARGE DIAGNOSIS  Diagnosis: Closed fracture of left hip, initial encounter  Assessment and Plan of Treatment: Pain Management  DVT Prophylaxis with Venodynes & Lovenox  D/c Staples on 6/16/21  D/c Lovenox on 6/16/21  Daily PT- WBAT with walker LLE  Follow-Up with Orthopedic Surgeon after Rehab   Follow up with Dr Richard in TWO- THREE WEEKS at [phone]      SECONDARY DISCHARGE DIAGNOSES  Diagnosis: Postoperative pain  Assessment and Plan of Treatment: You have acute pain resulting from hip fracture and subsequent surgery. You pain is controlled with medications. You may be given Tylenol for mild pain, low dose Oxycodone 2.5 mg for moderate pain and Oxycodone 5mg only for severe pain. Please note thatr you may get constipated with pain medications. You should be on a bowel regimen to prevent constipation. may give Senna 2 tabs at bedtime and Miralax as needed to achieve daily bowel movements.    Diagnosis: Acute blood loss anemia  Assessment and Plan of Treatment: You was noted to have a slight drop in Hemoglobin after surgery likely postoperative blood loss. You was given a dose of Venofer 200 mg IV once. You are advised to continue on Iron tablets with Vitamin C daily for a month to improve blood count. Increase green leafy vegetables in diet. Monitor Hemoglobin closely. repeat in 3 days.    Diagnosis: Schizoaffective disorder  Assessment and Plan of Treatment: You have a history of Bipolar disorder and delusional thoughts. You have not been taking any medications. You was evalauated by Psychiatrist Dr. Dilcia Tompkins during hospitalization and deemed no indication for adding any Psychotropic medications at this time.   Make sure to follow up with your outpatient psychiatrist or your can follow up at  the [facility] located at [address] [place] [address] [phone].  To follow up with [facility], call  to make an appointment.    Diagnosis: UTI (urinary tract infection)  Assessment and Plan of Treatment: Yo was found to have a positive urinanalysisu treated with antibiotic for a urinary tract infection. Report any signs and symptoms of infection, such as fever or chills, burning/pain with urination, urinary frequency/hesitancy, cloudy or bloody urine, foul smelling urine, back or flank pain    Diagnosis: Rhabdomyolysis  Assessment and Plan of Treatment: You were diagnosed with a mild case of rhabdomyolysis which is from muscle breakdown due to your fall. Your CK levels normalized.  Report any symptoms of unexplainable muscle pain or pain that is out of proportion despite taking pain medication, changes in the color of your urination, coca cola colored urine or bloody urine.    Diagnosis: Hyperglycemia  Assessment and Plan of Treatment: Your blood sugar was slightly elevated during your initial admission and self corrected.   Your A1c was 5.3 which does not suggest diabetes at this time.  However, make sure to report any signs and symptoms of hypo and hyperglycemia to your doctor such as dizziness, lightheadedness, increasing thirst, appetite and increase urination, cold, clammy skin.

## 2021-06-04 NOTE — PROGRESS NOTE ADULT - PROBLEM SELECTOR PLAN 5
Monitor blood glucose   f/u a1c
Controlled   A1C 5.3  monitor FS ACHS
Monitor blood glucose   f/u a1c
Monitor blood glucose   pending a1c

## 2021-06-04 NOTE — PROGRESS NOTE ADULT - SUBJECTIVE AND OBJECTIVE BOX
71yFemale    Diagnosis:  S/p IM nailing Left hip fx POD# 1    24hr interval/acute events:  Pt offers no acute complaints.   Pain is mild (3/10); well controlled.  Denies cp, sob, palpitations, paresthesias, nvd.    Vital Signs Last 24 Hrs  T(C): 36.7 (04 Jun 2021 05:18), Max: 37.2 (03 Jun 2021 20:29)  T(F): 98 (04 Jun 2021 05:18), Max: 98.9 (03 Jun 2021 20:29)  HR: 93 (04 Jun 2021 05:18) (83 - 101)  BP: 128/80 (04 Jun 2021 05:18) (97/57 - 145/62)  BP(mean): 72 (03 Jun 2021 11:35) (72 - 99)  RR: 17 (04 Jun 2021 05:18) (10 - 19)  SpO2: 93% (04 Jun 2021 05:18) (92% - 100%)  I&O's Detail    03 Jun 2021 07:01  -  04 Jun 2021 07:00  --------------------------------------------------------  IN:    sodium chloride 0.9%: 690 mL  Total IN: 690 mL    OUT:    Indwelling Catheter - Urethral (mL): 1050 mL  Total OUT: 1050 mL    Total NET: -360 mL          Physical Exam:    General: AAOx3, in NAD, resting comfortably in bed.  Left hip:    Skin pink, warm LLE.  In nl. alignment.   Dressing is C/D/I.    No ct, calves soft  2+pulses  NVI silt  5/5 strength ehl/ta/gs b/l.                          9.5    12.63 )-----------( 239      ( 04 Jun 2021 06:26 )             28.4     06-04    138  |  107  |  20<H>  ----------------------------<  103<H>  3.7   |  25  |  0.58    Ca    8.1<L>      04 Jun 2021 06:26    TPro  6.1  /  Alb  2.6<L>  /  TBili  0.3  /  DBili  x   /  AST  50<H>  /  ALT  35  /  AlkPhos  56  06-04      Impression:  71yFemale S/p IM nailing Left hip fx POD# 1  Plan:  -  Continue pain management and home meds  -  DVT prophylaxis with Lovenox  -  Daily Physical Therapy:  WBAT on LLE with appropriate assistive device  -  Discharge planning: Home Vs. Rehab pending Physical therapy eval.  -  Continue Antibiotics x 24hrs post-op  -  Case d/w Dr. Richard     71yFemale    Diagnosis:  S/p IM nailing Left hip fx POD# 1    24hr interval/acute events:  Pt offers no acute complaints.   Pain is mild (3/10); well controlled.  Denies cp, sob, palpitations, paresthesias, nvd.    Vital Signs Last 24 Hrs  T(C): 36.7 (04 Jun 2021 05:18), Max: 37.2 (03 Jun 2021 20:29)  T(F): 98 (04 Jun 2021 05:18), Max: 98.9 (03 Jun 2021 20:29)  HR: 93 (04 Jun 2021 05:18) (83 - 101)  BP: 128/80 (04 Jun 2021 05:18) (97/57 - 145/62)  BP(mean): 72 (03 Jun 2021 11:35) (72 - 99)  RR: 17 (04 Jun 2021 05:18) (10 - 19)  SpO2: 93% (04 Jun 2021 05:18) (92% - 100%)  I&O's Detail    03 Jun 2021 07:01  -  04 Jun 2021 07:00  --------------------------------------------------------  IN:    sodium chloride 0.9%: 690 mL  Total IN: 690 mL    OUT:    Indwelling Catheter - Urethral (mL): 1050 mL  Total OUT: 1050 mL    Total NET: -360 mL          Physical Exam:    General: AAOx3, in NAD, resting comfortably in bed.  Left hip:    Skin pink, warm LLE.  In nl. alignment.   Dressing is C/D/I.    No ct, calves soft  2+pulses  NVI silt  5/5 strength ehl/ta/gs b/l.                          9.5    12.63 )-----------( 239      ( 04 Jun 2021 06:26 )             28.4     06-04    138  |  107  |  20<H>  ----------------------------<  103<H>  3.7   |  25  |  0.58    Ca    8.1<L>      04 Jun 2021 06:26    TPro  6.1  /  Alb  2.6<L>  /  TBili  0.3  /  DBili  x   /  AST  50<H>  /  ALT  35  /  AlkPhos  56  06-04      Impression:  71yFemale S/p IM nailing Left hip fx POD# 1  Plan:  -  Continue pain management and home meds  -  DVT prophylaxis with Lovenox  -  Daily Physical Therapy:  PWB on LLE with appropriate assistive device  -  Discharge planning: Home Vs. Rehab pending Physical therapy eval. likely rehab  -  Continue Antibiotics x 24hrs post-op  -  Case d/w Dr. Richard

## 2021-06-04 NOTE — PHYSICAL THERAPY INITIAL EVALUATION ADULT - DIAGNOSIS, PT EVAL
Patient presents with impaired cognition, ROM, balance, endurance and increased pain. All of which are affecting her ability to independently complete bed mobility activities, transfers and ambulation.

## 2021-06-04 NOTE — PHYSICAL THERAPY INITIAL EVALUATION ADULT - GAIT DEVIATIONS NOTED, PT EVAL
decreased florentin/increased time in double stance/decreased velocity of limb motion/decreased step length/decreased stride length/decreased swing-to-stance ratio/decreased weight-shifting ability

## 2021-06-04 NOTE — PHYSICAL THERAPY INITIAL EVALUATION ADULT - TRANSFER SAFETY CONCERNS NOTED: BED/CHAIR, REHAB EVAL
decreased balance during turns/losing balance/decreased sequencing ability/stand pivot/decreased step length

## 2021-06-04 NOTE — PROGRESS NOTE ADULT - PROBLEM SELECTOR PLAN 8
Patient from home   PT recc: [facility]   Plan for discharge to [facility] 6/5   COVID ordered for tomorrow
Patient from home   will need PT eval for d/c planning

## 2021-06-04 NOTE — PROGRESS NOTE ADULT - PROBLEM SELECTOR PLAN 1
s/p mechanical fall  -HIP X ray shows Impacted intertrochanteric fracture of the left femoral neck  -Ortho following  -Pain mgnt consulted  POD 1

## 2021-06-04 NOTE — PROGRESS NOTE ADULT - ASSESSMENT
clinical application.   Patient Search   Multi-Patient Search   MME Calculator   Reports   Drug List   Designation   My NIECY #  Data Detail Level: Printer-Friendly View Extended View  Confidential Drug Utilization Report  Search Terms: lynnette sawant, 1950Search Date: 06/02/2021 12:07:40 PM  The Drug Utilization Report below displays all of the controlled substance prescriptions, if any, that your patient has filled in the last twelve months. The information displayed on this report is compiled from pharmacy submissions to the Department, and accurately reflects the information as submitted by the pharmacies.    This report was requested by: Melanie Barrett | Reference #: [id number]    There are no results for the search terms that you entered.

## 2021-06-04 NOTE — PHYSICAL THERAPY INITIAL EVALUATION ADULT - IMPAIRMENTS FOUND, PT EVAL
aerobic capacity/endurance/cognitive impairment/ergonomics and body mechanics/gait, locomotion, and balance/posture/ROM

## 2021-06-04 NOTE — DISCHARGE NOTE NURSING/CASE MANAGEMENT/SOCIAL WORK - PATIENT PORTAL LINK FT
You can access the FollowMyHealth Patient Portal offered by [facility] by registering at the following website: [email or web address]. By joining [facility]’s FollowMyHealth portal, you will also be able to view your health information using other applications (apps) compatible with our system.

## 2021-06-04 NOTE — PHYSICAL THERAPY INITIAL EVALUATION ADULT - GENERAL OBSERVATIONS, REHAB EVAL
Patient received supine in bed, NAD, +IV (DAREK Saldivar removed prior to initial evaluation), +ceballos catheter. Patient extremely confused and saying bizarre things that are unintelligible throughout initial evaluation. Patient told me her name is "Yumiko". Patient did follow commands 100% of the time and did seem motivated during initial evaluation. Patient is a poor historian.

## 2021-06-04 NOTE — DISCHARGE NOTE PROVIDER - NSDCFUADDINST_GEN_ALL_CORE_FT
DISCONTINUE LOVENOX AFTER PRESCRIBED 2 WEEKS    MONITOR CBC CLOSELY; REPEAT IN 3-4 DAYS; CONTINUE IRON SUPPLEMENT

## 2021-06-04 NOTE — DISCHARGE NOTE PROVIDER - NSDCMRMEDTOKEN_GEN_ALL_CORE_FT
CeleBREX 100 mg oral capsule: 1 cap(s) orally 2 times a day  enoxaparin: 30 milligram(s) injectable every 12 hours  gabapentin 100 mg oral capsule: 1 cap(s) orally 3 times a day  MiraLax oral powder for reconstitution: 17 gram(s) orally once a day (at bedtime), As Needed - for constipation  oxyCODONE 5 mg oral tablet: 1 tab(s) orally every 6 hours, As Needed - for severe pain  oxyCODONE 5 mg oral tablet: 0.5 tab(s) orally every 6 hours, As Needed - for moderate pain  Protonix 40 mg oral delayed release tablet: 1 tab(s) orally once a day  Senna 8.6 mg oral tablet: 1 tab(s) orally once a day (at bedtime)   CeleBREX 100 mg oral capsule: 1 cap(s) orally 2 times a day  enoxaparin: 30 milligram(s) injectable every 12 hours  ferrous sulfate 325 mg (65 mg elemental iron) oral tablet: 1 tab(s) orally once a day  gabapentin 100 mg oral capsule: 1 cap(s) orally 3 times a day  MiraLax oral powder for reconstitution: 17 gram(s) orally once a day (at bedtime), As Needed - for constipation  oxyCODONE 5 mg oral tablet: 1 tab(s) orally every 6 hours, As Needed - for severe pain  oxyCODONE 5 mg oral tablet: 0.5 tab(s) orally every 6 hours, As Needed - for moderate pain  Protonix 40 mg oral delayed release tablet: 1 tab(s) orally once a day  Senna 8.6 mg oral tablet: 1 tab(s) orally once a day (at bedtime)   CeleBREX 100 mg oral capsule: 1 cap(s) orally 2 times a day  enoxaparin: 30 milligram(s) injectable every 12 hours  gabapentin 100 mg oral capsule: 1 cap(s) orally 3 times a day  MiraLax oral powder for reconstitution: 17 gram(s) orally once a day (at bedtime), As Needed - for constipation  oxyCODONE 5 mg oral tablet: 1 tab(s) orally every 6 hours, As Needed - for severe pain  oxyCODONE 5 mg oral tablet: 0.5 tab(s) orally every 6 hours, As Needed - for moderate pain  Protonix 40 mg oral delayed release tablet: 1 tab(s) orally once a day  Senna 8.6 mg oral tablet: 1 tab(s) orally once a day (at bedtime)  Slow Fe (as elemental iron) 45 mg oral tablet, extended release: 1 tab(s) orally once a day MDD:1 tablet   enoxaparin: 30 milligram(s) injectable every 12 hours  gabapentin 100 mg oral capsule: 1 cap(s) orally 3 times a day  MiraLax oral powder for reconstitution: 17 gram(s) orally once a day (at bedtime), As Needed - for constipation  oxyCODONE 5 mg oral tablet: 0.5 tab(s) orally every 6 hours, As Needed - for moderate pain  Protonix 40 mg oral delayed release tablet: 1 tab(s) orally once a day  Senna 8.6 mg oral tablet: 1 tab(s) orally once a day (at bedtime)  Slow Fe (as elemental iron) 45 mg oral tablet, extended release: 1 tab(s) orally once a day MDD:1 tablet  Vitamin C 500 mg oral tablet: 1 tab(s) orally once a day

## 2021-06-04 NOTE — DISCHARGE NOTE PROVIDER - HOSPITAL COURSE
68 y/o Female admitted s/p fall sustaining a left hip intertrochanteric fracture. Patient underwent Left hip IM nailing 6/3/21. Patient had no complications during her hospital stay. Patient was followed by physical therapy and pain management. Pt is a 70 y/o female from home lives alone with PMH schizoaffective disorder (paranoid schizophrenia) and no other known comorbidities (does not follow with a PCP), BIBEMS after having a mechanical fall, sustaining a left hip intertrochanteric fracture and was admitted to ortho service.  Patient underwent Left hip IM nailing 6/3/21.  Pt was also diagnosed with UTI and was treated w/ CTX for 3 days.    Patient had no other complications during her hospital stay. Patient was followed by physical therapy and pain management.  Pt was noted to have mild anemia and received Venofer with plan to continue taking Iron pills on d/c.  Pt passed TOV on 6/5 and medically stable for d/c to [facility].  Of note, pt was also evaluated by psych for her known dx of schizoaffective d/o and did not require standing psychotropic medication but may administer Zyprexa Zydis ODT 5 mg q12h PRN severe agitation.       Pt is a 70 y/o female from home lives alone with PMH schizoaffective disorder (paranoid schizophrenia) and no other known comorbidities (does not follow with a PCP), BIBEMS after having a mechanical fall, sustaining a left hip intertrochanteric fracture and was admitted to ortho service.  Patient underwent Left hip IM nailing 6/3/21.  Pt was also diagnosed with UTI and was treated w/ CTX for 3 days.    Patient had no other complications during her hospital stay. Patient was followed by physical therapy and pain management.  Pt was noted to have mild anemia and received Venofer with plan to continue taking Iron pills on d/c.  On admission, her CK was 1340 and trended down to 986 two days later.  Pt passed TOV on 6/5 and medically stable for d/c to [facility].  Of note, pt was also evaluated by psych for her known dx of schizoaffective d/o and did not require standing psychotropic medication but may administer Zyprexa Zydis ODT 5 mg q12h PRN severe agitation.       [Recent Change In Weight] : ~T recent weight change [Negative] : Heme/Lymph

## 2021-06-04 NOTE — PROGRESS NOTE ADULT - PROBLEM SELECTOR PLAN 2
U/A positive, Ucx contaminated   -S/P Ceftriaxone
U/A significant for: Pyuria, LE, RBC, Bacteruria, urinary Sx+  - IV antibiotics with Rocephin started  -f/u U/C
U/A positive, Ucx testing  -Cont Ceftriaxone for now  -f/u U/C  -IVF hydration
U/A positive, Ucx contaminated   -Cont Ceftriaxone

## 2021-06-04 NOTE — PROGRESS NOTE ADULT - PROBLEM SELECTOR PLAN 6
hold off Ac for now , start after OR
Lovenox sQ QD  GI ppx - Protonix
hold off Ac for now , start after OR
Holding plan for OR today   Will start once cleared by ortho  GI ppx - Protonix

## 2021-06-04 NOTE — PHYSICAL THERAPY INITIAL EVALUATION ADULT - LIVES WITH, PROFILE
Lives alone in an apartment building with "a lot" of steps to enter. Patient states she does not intend on going back to the apartment that she came from./alone

## 2021-06-04 NOTE — PHYSICAL THERAPY INITIAL EVALUATION ADULT - LEVEL OF INDEPENDENCE, REHAB EVAL
The types of intraocular lenses were reviewed with the patient along with a discussion of their various strengths and weaknesses. 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 minimum assist (75% patients effort)

## 2021-06-04 NOTE — DISCHARGE NOTE PROVIDER - CARE PROVIDER_API CALL
Gunner Richard)  Orthopaedic Surgery  [address], [address] [place] [address]  Phone: [phone]  Fax: [phone]  Follow Up Time: 2 weeks

## 2021-06-04 NOTE — DISCHARGE NOTE PROVIDER - CONDITIONS AT DISCHARGE
Case has been discussed w/ Attending Dr. Barrett directing pt's care and medically cleared for discharge.

## 2021-06-04 NOTE — PROGRESS NOTE ADULT - PROBLEM SELECTOR PLAN 1
Acute hip pain, left. Recommendation: G[facility] Pt s/p left hip orif, pod#1  For OR today.  Can keep on morphine iv prn.    High risk medications reviewed. Avoid polypharmacy. Avoid IV opioids. Avoid NSAIDs and benzodiazepines. Non-pharmacological sleep aides initiated. Non-opioid medications and non-pharmacological pain management measures initiated.   Routine Meds  - Acetaminophen 1 gram po three times day for 4 days  Mild Pain ( Pain Level - 1-3)  - Non - Pharmacological Measures  Moderate Pain (Pain Level 4-6)  - Oxycodone 2.5mg po q 4 hours prn  Severe Pain ( Pain Level - 7-10)  - Oxycodone 5mg po q 4 hours prn  Bowel Regimen   - Senna and Miralax  OOB/PT.

## 2021-06-04 NOTE — DISCHARGE NOTE PROVIDER - NSDCCPTREATMENT_GEN_ALL_CORE_FT
PRINCIPAL PROCEDURE  Procedure: Open reduction and internal fixation of left hip using interlocking intramedullary bonnie  Findings and Treatment: Pain Management  DVT Prophylaxis with Venodynes & Lovenox  D/c Staples on 6/16/21  D/c Lovenox on 6/16/21  Daily PT- WBAT with walker LLE  Follow-Up with Orthopedic Surgeon after Rehab   Follow up with Dr Richard in TWO- THREE WEEKS at [phone]

## 2021-06-05 VITALS
RESPIRATION RATE: 18 BRPM | SYSTOLIC BLOOD PRESSURE: 99 MMHG | DIASTOLIC BLOOD PRESSURE: 63 MMHG | HEART RATE: 103 BPM | TEMPERATURE: 99 F | OXYGEN SATURATION: 94 %

## 2021-06-05 DIAGNOSIS — R73.9 HYPERGLYCEMIA, UNSPECIFIED: ICD-10-CM

## 2021-06-05 LAB
ANION GAP SERPL CALC-SCNC: 9 MMOL/L — SIGNIFICANT CHANGE UP (ref 5–17)
BASOPHILS # BLD AUTO: 0.02 K/UL — SIGNIFICANT CHANGE UP (ref 0–0.2)
BASOPHILS NFR BLD AUTO: 0.2 % — SIGNIFICANT CHANGE UP (ref 0–2)
BLD GP AB SCN SERPL QL: SIGNIFICANT CHANGE UP
BUN SERPL-MCNC: 12 MG/DL — SIGNIFICANT CHANGE UP (ref 7–18)
CALCIUM SERPL-MCNC: 7.8 MG/DL — LOW (ref 8.4–10.5)
CHLORIDE SERPL-SCNC: 107 MMOL/L — SIGNIFICANT CHANGE UP (ref 96–108)
CO2 SERPL-SCNC: 24 MMOL/L — SIGNIFICANT CHANGE UP (ref 22–31)
CREAT SERPL-MCNC: 0.31 MG/DL — LOW (ref 0.5–1.3)
EOSINOPHIL # BLD AUTO: 0.14 K/UL — SIGNIFICANT CHANGE UP (ref 0–0.5)
EOSINOPHIL NFR BLD AUTO: 1.2 % — SIGNIFICANT CHANGE UP (ref 0–6)
GLUCOSE SERPL-MCNC: 100 MG/DL — HIGH (ref 70–99)
HCT VFR BLD CALC: 25.7 % — LOW (ref 34.5–45)
HCT VFR BLD CALC: 26.1 % — LOW (ref 34.5–45)
HGB BLD-MCNC: 8.4 G/DL — LOW (ref 11.5–15.5)
HGB BLD-MCNC: 8.5 G/DL — LOW (ref 11.5–15.5)
IMM GRANULOCYTES NFR BLD AUTO: 0.7 % — SIGNIFICANT CHANGE UP (ref 0–1.5)
LYMPHOCYTES # BLD AUTO: 2.9 K/UL — SIGNIFICANT CHANGE UP (ref 1–3.3)
LYMPHOCYTES # BLD AUTO: 24.3 % — SIGNIFICANT CHANGE UP (ref 13–44)
MCHC RBC-ENTMCNC: 29.8 PG — SIGNIFICANT CHANGE UP (ref 27–34)
MCHC RBC-ENTMCNC: 29.8 PG — SIGNIFICANT CHANGE UP (ref 27–34)
MCHC RBC-ENTMCNC: 32.6 GM/DL — SIGNIFICANT CHANGE UP (ref 32–36)
MCHC RBC-ENTMCNC: 32.7 GM/DL — SIGNIFICANT CHANGE UP (ref 32–36)
MCV RBC AUTO: 91.1 FL — SIGNIFICANT CHANGE UP (ref 80–100)
MCV RBC AUTO: 91.6 FL — SIGNIFICANT CHANGE UP (ref 80–100)
MONOCYTES # BLD AUTO: 0.99 K/UL — HIGH (ref 0–0.9)
MONOCYTES NFR BLD AUTO: 8.3 % — SIGNIFICANT CHANGE UP (ref 2–14)
NEUTROPHILS # BLD AUTO: 7.78 K/UL — HIGH (ref 1.8–7.4)
NEUTROPHILS NFR BLD AUTO: 65.3 % — SIGNIFICANT CHANGE UP (ref 43–77)
NRBC # BLD: 0 /100 WBCS — SIGNIFICANT CHANGE UP (ref 0–0)
NRBC # BLD: 0 /100 WBCS — SIGNIFICANT CHANGE UP (ref 0–0)
PLATELET # BLD AUTO: 217 K/UL — SIGNIFICANT CHANGE UP (ref 150–400)
PLATELET # BLD AUTO: 218 K/UL — SIGNIFICANT CHANGE UP (ref 150–400)
POTASSIUM SERPL-MCNC: 3.3 MMOL/L — LOW (ref 3.5–5.3)
POTASSIUM SERPL-SCNC: 3.3 MMOL/L — LOW (ref 3.5–5.3)
RBC # BLD: 2.82 M/UL — LOW (ref 3.8–5.2)
RBC # BLD: 2.85 M/UL — LOW (ref 3.8–5.2)
RBC # FLD: 13.5 % — SIGNIFICANT CHANGE UP (ref 10.3–14.5)
RBC # FLD: 13.6 % — SIGNIFICANT CHANGE UP (ref 10.3–14.5)
SARS-COV-2 RNA SPEC QL NAA+PROBE: SIGNIFICANT CHANGE UP
SODIUM SERPL-SCNC: 140 MMOL/L — SIGNIFICANT CHANGE UP (ref 135–145)
WBC # BLD: 10.86 K/UL — HIGH (ref 3.8–10.5)
WBC # BLD: 11.91 K/UL — HIGH (ref 3.8–10.5)
WBC # FLD AUTO: 10.86 K/UL — HIGH (ref 3.8–10.5)
WBC # FLD AUTO: 11.91 K/UL — HIGH (ref 3.8–10.5)

## 2021-06-05 PROCEDURE — 86803 HEPATITIS C AB TEST: CPT

## 2021-06-05 PROCEDURE — 80048 BASIC METABOLIC PNL TOTAL CA: CPT

## 2021-06-05 PROCEDURE — 85025 COMPLETE CBC W/AUTO DIFF WBC: CPT

## 2021-06-05 PROCEDURE — C1889: CPT

## 2021-06-05 PROCEDURE — 97162 PT EVAL MOD COMPLEX 30 MIN: CPT

## 2021-06-05 PROCEDURE — 86923 COMPATIBILITY TEST ELECTRIC: CPT

## 2021-06-05 PROCEDURE — 80053 COMPREHEN METABOLIC PANEL: CPT

## 2021-06-05 PROCEDURE — C1713: CPT

## 2021-06-05 PROCEDURE — 86900 BLOOD TYPING SEROLOGIC ABO: CPT

## 2021-06-05 PROCEDURE — 71045 X-RAY EXAM CHEST 1 VIEW: CPT

## 2021-06-05 PROCEDURE — 96375 TX/PRO/DX INJ NEW DRUG ADDON: CPT

## 2021-06-05 PROCEDURE — 86850 RBC ANTIBODY SCREEN: CPT

## 2021-06-05 PROCEDURE — 82962 GLUCOSE BLOOD TEST: CPT

## 2021-06-05 PROCEDURE — 72192 CT PELVIS W/O DYE: CPT

## 2021-06-05 PROCEDURE — 86769 SARS-COV-2 COVID-19 ANTIBODY: CPT

## 2021-06-05 PROCEDURE — 87635 SARS-COV-2 COVID-19 AMP PRB: CPT

## 2021-06-05 PROCEDURE — 76000 FLUOROSCOPY <1 HR PHYS/QHP: CPT

## 2021-06-05 PROCEDURE — 99231 SBSQ HOSP IP/OBS SF/LOW 25: CPT

## 2021-06-05 PROCEDURE — 83605 ASSAY OF LACTIC ACID: CPT

## 2021-06-05 PROCEDURE — 81001 URINALYSIS AUTO W/SCOPE: CPT

## 2021-06-05 PROCEDURE — 96374 THER/PROPH/DIAG INJ IV PUSH: CPT

## 2021-06-05 PROCEDURE — 97110 THERAPEUTIC EXERCISES: CPT

## 2021-06-05 PROCEDURE — 99232 SBSQ HOSP IP/OBS MODERATE 35: CPT

## 2021-06-05 PROCEDURE — 87086 URINE CULTURE/COLONY COUNT: CPT

## 2021-06-05 PROCEDURE — 97530 THERAPEUTIC ACTIVITIES: CPT

## 2021-06-05 PROCEDURE — 82550 ASSAY OF CK (CPK): CPT

## 2021-06-05 PROCEDURE — 86901 BLOOD TYPING SEROLOGIC RH(D): CPT

## 2021-06-05 PROCEDURE — 83036 HEMOGLOBIN GLYCOSYLATED A1C: CPT

## 2021-06-05 PROCEDURE — 85027 COMPLETE CBC AUTOMATED: CPT

## 2021-06-05 PROCEDURE — 70450 CT HEAD/BRAIN W/O DYE: CPT

## 2021-06-05 PROCEDURE — 73552 X-RAY EXAM OF FEMUR 2/>: CPT

## 2021-06-05 PROCEDURE — 73502 X-RAY EXAM HIP UNI 2-3 VIEWS: CPT

## 2021-06-05 PROCEDURE — 72131 CT LUMBAR SPINE W/O DYE: CPT

## 2021-06-05 PROCEDURE — 36415 COLL VENOUS BLD VENIPUNCTURE: CPT

## 2021-06-05 PROCEDURE — 99285 EMERGENCY DEPT VISIT HI MDM: CPT

## 2021-06-05 PROCEDURE — 93005 ELECTROCARDIOGRAM TRACING: CPT

## 2021-06-05 RX ORDER — POTASSIUM CHLORIDE 20 MEQ
40 PACKET (EA) ORAL ONCE
Refills: 0 | Status: COMPLETED | OUTPATIENT
Start: 2021-06-05 | End: 2021-06-05

## 2021-06-05 RX ORDER — IRON SUCROSE 20 MG/ML
200 INJECTION, SOLUTION INTRAVENOUS ONCE
Refills: 0 | Status: COMPLETED | OUTPATIENT
Start: 2021-06-05 | End: 2021-06-05

## 2021-06-05 RX ORDER — ASCORBIC ACID 60 MG
1 TABLET,CHEWABLE ORAL
Qty: 0 | Refills: 0 | DISCHARGE

## 2021-06-05 RX ORDER — IRON SUCROSE 20 MG/ML
100 INJECTION, SOLUTION INTRAVENOUS ONCE
Refills: 0 | Status: DISCONTINUED | OUTPATIENT
Start: 2021-06-05 | End: 2021-06-05

## 2021-06-05 RX ORDER — CELECOXIB 200 MG/1
1 CAPSULE ORAL
Qty: 0 | Refills: 0 | DISCHARGE
End: 2021-06-18

## 2021-06-05 RX ORDER — OXYCODONE HYDROCHLORIDE 5 MG/1
1 TABLET ORAL
Qty: 0 | Refills: 0 | DISCHARGE

## 2021-06-05 RX ORDER — FERROUS SULFATE 325(65) MG
1 TABLET ORAL
Qty: 0 | Refills: 0 | DISCHARGE

## 2021-06-05 RX ORDER — ASCORBIC ACID 60 MG
500 TABLET,CHEWABLE ORAL DAILY
Refills: 0 | Status: DISCONTINUED | OUTPATIENT
Start: 2021-06-05 | End: 2021-06-05

## 2021-06-05 RX ORDER — FERROUS SULFATE 325(65) MG
1 TABLET ORAL
Qty: 90 | Refills: 0
Start: 2021-06-05 | End: 2021-09-02

## 2021-06-05 RX ADMIN — Medication 1000 MILLIGRAM(S): at 21:12

## 2021-06-05 RX ADMIN — Medication 40 MILLIEQUIVALENT(S): at 10:51

## 2021-06-05 RX ADMIN — OXYCODONE HYDROCHLORIDE 5 MILLIGRAM(S): 5 TABLET ORAL at 11:20

## 2021-06-05 RX ADMIN — ENOXAPARIN SODIUM 30 MILLIGRAM(S): 100 INJECTION SUBCUTANEOUS at 21:34

## 2021-06-05 RX ADMIN — Medication 1000 MILLIGRAM(S): at 14:35

## 2021-06-05 RX ADMIN — Medication 1000 MILLIGRAM(S): at 21:56

## 2021-06-05 RX ADMIN — OXYCODONE HYDROCHLORIDE 5 MILLIGRAM(S): 5 TABLET ORAL at 11:46

## 2021-06-05 RX ADMIN — Medication 40 MILLIEQUIVALENT(S): at 09:59

## 2021-06-05 RX ADMIN — IRON SUCROSE 73.33 MILLIGRAM(S): 20 INJECTION, SOLUTION INTRAVENOUS at 13:03

## 2021-06-05 RX ADMIN — ENOXAPARIN SODIUM 30 MILLIGRAM(S): 100 INJECTION SUBCUTANEOUS at 09:52

## 2021-06-05 RX ADMIN — Medication 500 MILLIGRAM(S): at 11:35

## 2021-06-05 RX ADMIN — Medication 1000 MILLIGRAM(S): at 13:03

## 2021-06-05 RX ADMIN — SENNA PLUS 2 TABLET(S): 8.6 TABLET ORAL at 21:12

## 2021-06-05 RX ADMIN — PANTOPRAZOLE SODIUM 40 MILLIGRAM(S): 20 TABLET, DELAYED RELEASE ORAL at 06:15

## 2021-06-05 RX ADMIN — Medication 1000 MILLIGRAM(S): at 05:36

## 2021-06-05 RX ADMIN — Medication 1000 MILLIGRAM(S): at 06:14

## 2021-06-05 NOTE — PROGRESS NOTE ADULT - PROBLEM SELECTOR PROBLEM 1
Acute hip pain, left
Closed fracture of left hip, initial encounter

## 2021-06-05 NOTE — PROGRESS NOTE ADULT - PROBLEM SELECTOR PLAN 1
Acute hip pain, left. Recommendation: GOHCo Pt s/p left hip orif, pod#2. h/h to be repeated  For OR today.  Can keep on morphine iv prn.    High risk medications reviewed. Avoid polypharmacy. Avoid IV opioids. Avoid NSAIDs and benzodiazepines. Non-pharmacological sleep aides initiated. Non-opioid medications and non-pharmacological pain management measures initiated.   Routine Meds  - Acetaminophen 1 gram po three times day for 4 days  Mild Pain ( Pain Level - 1-3)  - Non - Pharmacological Measures  Moderate Pain (Pain Level 4-6)  - Oxycodone 2.5mg po q 4 hours prn  Severe Pain ( Pain Level - 7-10)  - Oxycodone 5mg po q 4 hours prn  Bowel Regimen   - Senna and Miralax  OOB/PT.

## 2021-06-05 NOTE — PROGRESS NOTE ADULT - ASSESSMENT
clinical application.   Patient Search   Multi-Patient Search   MME Calculator   Reports   Drug List   Designation   My NIECY #  Data Detail Level: Printer-Friendly View Extended View  Confidential Drug Utilization Report  Search Terms: lynnette sawant, 1950Search Date: 06/02/2021 12:07:40 PM  The Drug Utilization Report below displays all of the controlled substance prescriptions, if any, that your patient has filled in the last twelve months. The information displayed on this report is compiled from pharmacy submissions to the Department, and accurately reflects the information as submitted by the pharmacies.    This report was requested by: Melanie Barrett | Reference #: [id number]    There are no results for the search terms that you entered.

## 2021-06-05 NOTE — PROGRESS NOTE ADULT - REASON FOR ADMISSION
intertrochanteric fracture/ GoHCO

## 2021-06-05 NOTE — PROGRESS NOTE ADULT - PROVIDER SPECIALTY LIST ADULT
Internal Medicine
Orthopedics
Pain Medicine
Internal Medicine
Pain Medicine
Pain Medicine
Internal Medicine
Internal Medicine

## 2021-06-05 NOTE — CHART NOTE - NSCHARTNOTESELECT_GEN_ALL_CORE
[facility]- Collateral note/Event Note
[facility]- Collateral note/Event Note
Hospital Medicine Follow up Note
Hospitalist Follow up Note:
[facility]- Collateral Note/Event Note

## 2021-06-05 NOTE — CHART NOTE - NSCHARTNOTEFT_GEN_A_CORE
71 year old woman with no known medical conditions, but with documented psych hx of schizoaffective and bipolar disorders not noted to be on medications brought in by EMS on account of a mechanical fall after losing her balance falling backwards on her left hip found to have left hip fracture in ED with pain and inability to ambulate. Patient seen by Orthopedics scheduled for OR tonight  Pain control  Bowel regimen  Patient with no chest pain or SOB and no Cardiopulmonary history;   Patient optimized at moderate risk for planned surgical intervention with no further work up at this time  DVT ppx after surgical intervention  Psych consult for behavioral evaluation to identify if any Rx indicated as requested by Son. Dr. Tompkins informed by SREEDHAR Mead  Discussed with Ortho AYLEEN Franco and Pain mgmt SREEDHAR Navarro and RN
Patient seen and examined this morning aqround 11 AM    Patient with hx Bipolar disorder not on any home meds admitted s/p Fall with left hip fracture s/p repair yesterday.     Patient comfortable, in bed; ready for PT;  stil had Levi in place  denies fever, chills, SOB, palpitations, chest pain, nausea, vomiting, diarrhea, constipation, dizziness    Vital Signs Last 24 Hrs  T(C): 37.6 (05 Jun 2021 14:35), Max: 37.7 (04 Jun 2021 21:43)  T(F): 99.7 (05 Jun 2021 14:35), Max: 99.8 (04 Jun 2021 21:43)  HR: 96 (05 Jun 2021 14:35) (93 - 98)  BP: 112/59 (05 Jun 2021 14:35) (108/51 - 152/77)  RR: 18 (05 Jun 2021 14:35) (16 - 18)  SpO2: 99% (05 Jun 2021 14:35) (95% - 99%)    P/E:  elderly overweight female, NAD  Neuro: AAO x3; mood much more stable today  CVS: S1S2 present, regular  Resp: BLAE+, No wheeze or Rhonchi  GI: Soft, BS+, NT  Extr: No edema or calf tenderness  Left hip tenderness sx site with some swelling    Labs:                                 8.4    10.86 )-----------( 217      ( 05 Jun 2021 09:53 )             25.7     06-05    140  |  107  |  12  ----------------------------<  100<H>  3.3<L>   |  24  |  0.31<L>    Ca    7.8<L>      05 Jun 2021 06:45    TPro  6.1  /  Alb  2.6<L>  /  TBili  0.3  /  DBili  x   /  AST  50<H>  /  ALT  35  /  AlkPhos  56  06-04      D/D:  Postoperative blood loss anemia acute  Left Hip intertrochanteric fracture s/p IM nailing POD#2  Acute pain due to fracture and postoperative stable  Leucocytosis likely reactive postoperative resolved  At risk for Constipation  Delusional disorder  Hx Bipolar disorder stable    Plan:  Diet as tolerated  Pain control as per Pain mgmt   Bowel regimen   PT evaluation LUNA: For [facility]  As per Ortho no concern for ongoing bleed;   Venofer x 1 dose given;   DVT ppx  Psych eval appreciated primary Delusional disorder not needing any standing meds as per Psych      Discussed with Ortho PA and Pain mgmt and NP Lino  Discussed with Patient and RN.Tony
Sanjuana attempted to reach patient's court appointed guardian; Sharita [phone] from [facility]. Voicemail box is full. Sanjuana will call again later
========================  FOR EACH COLLATERAL  ========================  NAME: Argenis Cruz  NUMBER: (H): [phone]   RELATIONSHIP: Daughter-in-law  RELIABILITY: Reliability is good.  COMMENTS: Family expressed concern over patients resistance for medical and psychiatric care/services.     NAME: Sharita  NUMBER: [phone]/ office: [phone]  RELATIONSHIP: State/court appointed guardian      ========================  PATIENT DEMOGRAPHICS:  ========================  HPI  BASELINE FUNCTIONING: Per collateral at baseline patient lives alone, is able to complete her own adls and shop for groceries. Patient however does not do her shopping and family does the grocery shopping for her. At baseline patient refuses to take psychiatric medications. At baseline patient can have periods where she is calm and quiet and then at other times is loud, irritable, responding to internal stimuli (will be talking to someone that is not in the room). At baseline patient is not physically aggressive but can be verbally agitated. At baseline patient is resistant to going to doctor appointments. Will disconnect her phone that family sets up for her. At baseline patient also has a court appointed guardian who will visit her 1x a month.  DATE HPI STARTED: No specific date  DECOMPENSATION: Patient has not had any significant psychiatric decompensation, she continues to be paranoid/guarded, poor insight into illness and need for treatment.   SUICIDALITY: No hx of suicide attempts, gestures or ideations   VIOLENCE:  No hx of being of physically violent. Has a hx of being verbally aggressive and agitated  SUBSTANCE:  No hx of drug or alcohol abuse.       ========================  PAST PSYCHIATRIC HISTORY  ========================  DATE PAST PSYCHIATRIC HISTORY STARTED: Per collateral patient has a long standing psychiatric history, with some paranoia and hallucinations. 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 Few years ago due to her inability to care for herself and families inability to help due to patients resistance, patients brother initiated guardianship. Patient was appointed a state appointed guardian at that time   MAIN PSYCHIATRIC DIAGNOSIS: Schizoaffective disorder  PSYCHIATRIC HOSPITALIZATIONS:  1 prior inpatient psychiatric hospitalization in 2018 at [place] [facility]. Patient has hx of multiple ED visits for mental health issues, last one on 4/2/2021 at [facility] for being loud and disruptive in her home.   PRIOR ILLNESS: Schizoaffective   SUICIDALITY: No hx of suicide attempts, gestures or ideations   VIOLENCE:  No hx of being of physically violent. Has a hx of being verbally aggressive and agitated  SUBSTANCE:  No hx of drug or alcohol abuse.       ==============  OTHER HISTORY  ==============  CURRENT MEDICATION: None reported.  MEDICAL HISTORY:  No hx of medical issues. Per collateral patients teeth have "rotted out"  ALLERGIES: NKA   LEGAL ISSUES: She has a court/state appointed guardian, Sharita (c:[phone] or office: [phone]) from [facility].  FIREARM ACCESS: No access to firearms  SOCIAL HISTORY: , lives alone. Connected to meals on wheels.  FAMILY HISTORY: Brother had developmental disabilities    COVID Exposure Screen- collateral    1. Has the patient had a COVID-19 test in the last 90 days? Yes  2. Date of test and results: 4/2/2021 and negative  3. Has the patient received 2 doses of the COVID-19 vaccine? No  4. In the past 10 days, has the patient been around anyone with a positive COVID-19 test? No  5. Has the patient been out of [place] within the last 10 days? No
========================  FOR EACH COLLATERAL  ========================  NAME: Sharita  NUMBER: [phone] or office: [phone]  RELATIONSHIP: Court appointed guardian/  RELIABILITY: Reliability is good. She has known the patient for 3 yrs and last saw her on 5/19/2021    Per collateral at baseline patient is constantly talking to herself, believes she has a new apartment and will state that she is Cristian Trumps wife. At baseline patient refuses psychiatric medications. Collateral states that at her last visit on 5/19/2021, she brought a visiting nurse to complete an assessment for home care services, patient initially was resistant but did comply with the evaluation/assessment and is to be getting home care services to help with laundry and grocery shopping.   Per collateral, the guardianship is to help with managing her fiances mainly, also provide case management services and link to resources. With the help of the guardian/ patient was signed up with [employer], has had medical appointments conducted (doctors were sent to patients home) and had lab work done.    Per [facility] patient is on a wait list for ACT services with possible assignment for 7/2021.    Per [facility] patient is also connected to [facility] care coordination.    Per collateral, patient last inpatient psychiatric hospitalization was from 5/2020- 7/2020 at [facility]. During that hospitalization patient was non-adherent with medications on the unit and had to go to court for treatment over objection. Per collateral approximately 2 weeks after the discharge patient was decompensating again, neighbors called 911 and patient was sent to [facility] ED for evaluation but was released from the ED.    Per collateral, it is the neighbors who call 911 due to patient being very loud, yelling, slamming doors.     Per collateral, she will obtain discharge information from [facility] and provide to this .

## 2021-06-05 NOTE — PROGRESS NOTE ADULT - SUBJECTIVE AND OBJECTIVE BOX
Source of information: , Chart review  Patient language: English  : n/a    CC:  left hip pain    This is a 71yFemale patient who presents to the hospital for Patient is a 71y old  Female who presents with a chief complaint of intertrochanteric fracture/ GoHCO (05 Jun 2021 10:09)    Pt s/p left hip IM nail, pod#2.  + left hip pain.  Pain on exertion.  Pt is oob to chair.  h/h low.  repeat cbc.  no chest pain or sob.    PAIN SCORE:    3/10     SCALE USED: (1-10 NRS)      PAST MEDICAL & SURGICAL HISTORY:  Schizoaffective disorder    Bipolar disorder    No significant past surgical history        FAMILY HISTORY:  No pertinent family history in first degree relatives          SOCIAL HISTORY:  [ x] Denies Smoking, Alcohol, or Drug Use    Allergies    No Known Allergies    Intolerances        MEDICATIONS:    MEDICATIONS  (STANDING):  acetaminophen   Tablet .. 1000 milliGRAM(s) Oral every 8 hours  ascorbic acid 500 milliGRAM(s) Oral daily  enoxaparin Injectable 30 milliGRAM(s) SubCutaneous every 12 hours  iron sucrose IVPB 200 milliGRAM(s) IV Intermittent once  pantoprazole    Tablet 40 milliGRAM(s) Oral before breakfast  polyethylene glycol 3350 17 Gram(s) Oral daily  senna 2 Tablet(s) Oral at bedtime  sodium chloride 0.9%. 1000 milliLiter(s) (125 mL/Hr) IV Continuous <Continuous>  sodium chloride 0.9%. 1000 milliLiter(s) (115 mL/Hr) IV Continuous <Continuous>    MEDICATIONS  (PRN):  benzocaine 15 mG/menthol 3.6 mG (Sugar-Free) Lozenge 1 Lozenge Oral every 4 hours PRN Sore Throat  oxyCODONE    IR 2.5 milliGRAM(s) Oral every 4 hours PRN Moderate Pain (4 - 6)  oxyCODONE    IR 5 milliGRAM(s) Oral every 4 hours PRN Severe Pain (7 - 10)      Vital Signs Last 24 Hrs  T(C): 37.7 (05 Jun 2021 06:24), Max: 37.7 (04 Jun 2021 21:43)  T(F): 99.8 (05 Jun 2021 06:24), Max: 99.8 (04 Jun 2021 21:43)  HR: 93 (05 Jun 2021 06:24) (93 - 103)  BP: 118/65 (05 Jun 2021 06:24) (101/59 - 139/59)  BP(mean): 70 (04 Jun 2021 11:08) (70 - 70)  RR: 16 (05 Jun 2021 06:24) (16 - 16)  SpO2: 96% (05 Jun 2021 06:24) (95% - 98%)    LABS:                          8.4    10.86 )-----------( 217      ( 05 Jun 2021 09:53 )             25.7     06-05    140  |  107  |  12  ----------------------------<  100<H>  3.3<L>   |  24  |  0.31<L>    Ca    7.8<L>      05 Jun 2021 06:45    TPro  6.1  /  Alb  2.6<L>  /  TBili  0.3  /  DBili  x   /  AST  50<H>  /  ALT  35  /  AlkPhos  56  06-04      LIVER FUNCTIONS - ( 04 Jun 2021 06:26 )  Alb: 2.6 g/dL / Pro: 6.1 g/dL / ALK PHOS: 56 U/L / ALT: 35 U/L DA / AST: 50 U/L / GGT: x           Urinalysis Basic - ( 04 Jun 2021 04:12 )    Color: x / Appearance: x / SG: x / pH: x  Gluc: x / Ketone: x  / Bili: x / Urobili: x   Blood: x / Protein: x / Nitrite: x   Leuk Esterase: x / RBC: 2-5 /HPF / WBC 3-5 /HPF   Sq Epi: x / Non Sq Epi: Few /HPF / Bacteria: Moderate /HPF      CAPILLARY BLOOD GLUCOSE          Radiology:    Drug Screen:        REVIEW OF SYSTEMS:  unable to obtain due to mentatoin    PHYSICAL EXAM:  GENERAL:  Alert & Oriented X3, NAD, Good concentration  CHEST/LUNG: Clear to auscultation bilaterally; No rales, rhonchi, wheezing, or rubs  HEART: Regular rate and rhythm; No murmurs, rubs, or gallops  ABDOMEN: Soft, Nontender, Nondistended; Bowel sounds present  EXTREMITIES:  2+ Peripheral Pulses, No cyanosis, or edema  MUSCULOSKELETAL: + decreased rom + left hip tenderness   SKIN: No rashes or lesions    Risk factors associated with adverse outcomes related to opioid treatment  [ ]  Concurrent benzodiazepine use  [ ]  History/ Active substance use or alcohol use disorder  [ ] Psychiatric co-morbidity  [ ] Sleep apnea  [ ] COPD  [ ] BMI> 35  [ ] Liver dysfunction  [ ] Renal dysfunction  [ ] CHF  [ ] Smoker  [x ]  Age > 60 years    [ x]  [place]  Reviewed and Copied to Chart. See below.    Plan of care and goal oriented pain management treatment options were discussed with patient and /or primary care giver; all questions and concerns were addressed and care was aligned with patient's wishes.    Educated patient on goal oriented pain management treatment options     06-05-21 @ 10:59

## 2021-06-05 NOTE — PROGRESS NOTE ADULT - SUBJECTIVE AND OBJECTIVE BOX
71yFemale    Diagnosis:  S/p L Hip IM Nailing POD#2    Patient was seen and evaluated at bedside. Patient with no acute complaints.   Pain is well controlled.  Denies CP/SOB, dyspnea, paresthesias, N/V/D, palpitations.     Vital Signs Last 24 Hrs  T(C): 37.7 (05 Jun 2021 06:24), Max: 37.7 (04 Jun 2021 21:43)  T(F): 99.8 (05 Jun 2021 06:24), Max: 99.8 (04 Jun 2021 21:43)  HR: 93 (05 Jun 2021 06:24) (93 - 103)  BP: 118/65 (05 Jun 2021 06:24) (101/59 - 139/59)  BP(mean): 70 (04 Jun 2021 11:08) (70 - 70)  RR: 16 (05 Jun 2021 06:24) (16 - 16)  SpO2: 96% (05 Jun 2021 06:24) (95% - 98%)  I&O's Detail    04 Jun 2021 07:01  -  05 Jun 2021 07:00  --------------------------------------------------------  IN:  Total IN: 0 mL    OUT:    Indwelling Catheter - Urethral (mL): 1850 mL  Total OUT: 1850 mL    Total NET: -1850 mL          Physical Exam:    General: AAOx3, NAD, resting comfortably in bed.    L Hip:  Dressing is C/D/I. Skin is pink and warm. Staples intact. No erythema. SILT.  Wound with no drainage, healing well.   Lower extremity:  No calf tenderness, calves are soft. 2+pulses. NVI. 5/5 Strength of EHL/TA/gastrocnemius B/L.  Good capillary refill. Warm, well-perfused.                           8.4    10.86 )-----------( 217      ( 05 Jun 2021 09:53 )             25.7     06-05    140  |  107  |  12  ----------------------------<  100<H>  3.3<L>   |  24  |  0.31<L>    Ca    7.8<L>      05 Jun 2021 06:45    TPro  6.1  /  Alb  2.6<L>  /  TBili  0.3  /  DBili  x   /  AST  50<H>  /  ALT  35  /  AlkPhos  56  06-04      Impression:  71yFemale S/p L Hip IM Nailing POD#2  Plan:  -  Pain management  -  Dvt prophylaxis with Lovenox  -  Daily Physical Theapy:  WBAT of left lower extremity  -  Discharge planning: Rehab  -  Administer 1 dose Venofer- acute blood loss anemia  -  Dressing changed

## 2021-06-05 NOTE — PROGRESS NOTE ADULT - PROBLEM SELECTOR PROBLEM 2
UTI (urinary tract infection)
Closed fracture of left hip, initial encounter
UTI (urinary tract infection)

## 2021-06-17 DIAGNOSIS — S72.142A DISPLACED INTERTROCHANTERIC FRACTURE OF LEFT FEMUR, INITIAL ENCOUNTER FOR CLOSED FRACTURE: ICD-10-CM

## 2021-06-17 PROBLEM — Z00.00 ENCOUNTER FOR PREVENTIVE HEALTH EXAMINATION: Status: ACTIVE | Noted: 2021-06-17

## 2021-06-22 ENCOUNTER — APPOINTMENT (OUTPATIENT)
Dept: ORTHOPEDIC SURGERY | Facility: CLINIC | Age: 71
End: 2021-06-22

## 2021-06-29 ENCOUNTER — APPOINTMENT (OUTPATIENT)
Dept: ORTHOPEDIC SURGERY | Facility: CLINIC | Age: 71
End: 2021-06-29
Payer: MEDICARE

## 2021-06-29 VITALS
HEART RATE: 90 BPM | WEIGHT: 154 LBS | OXYGEN SATURATION: 95 % | HEIGHT: 64 IN | BODY MASS INDEX: 26.29 KG/M2 | SYSTOLIC BLOOD PRESSURE: 137 MMHG | DIASTOLIC BLOOD PRESSURE: 83 MMHG

## 2021-06-29 PROCEDURE — ZZZZZ: CPT

## 2022-01-19 NOTE — ED PROVIDER NOTE - HISTORY ATTESTATION, MLM
[FreeTextEntry1] : ECG performed today at the office revealed a NSR, with normal AQRS, DC, QRS and QTc. LVH criteria. \par 
I have reviewed and confirmed nurses' notes...

## 2023-11-16 NOTE — ED ADULT TRIAGE NOTE - HEART RATE (BEATS/MIN)
87 Detail Level: Detailed Quality 110: Preventive Care And Screening: Influenza Immunization: Influenza immunization was not ordered or administered, reason not given Quality 226: Preventive Care And Screening: Tobacco Use: Screening And Cessation Intervention: Patient screened for tobacco use and is an ex/non-smoker Quality 111:Pneumonia Vaccination Status For Older Adults: Patient did not receive any pneumococcal conjugate or polysaccharide vaccine on or after their 60th birthday and before the end of the measurement period

## 2024-02-22 NOTE — PHYSICAL THERAPY INITIAL EVALUATION ADULT - ASSISTIVE DEVICE, REHAB EVAL
2/22/2024  A1C from 11/2023 was 10. Continue on insulin and monitor accucheck.   2/23/2024  Follow up A1c.   2/24/2024  increase lantus to 18 units and lispro to 8 units tidcc.    bed rails

## 2025-06-16 NOTE — BRIEF OPERATIVE NOTE - CONDITION POST OP
EKG ordered as per ACP Keyon De La Rosa, care is ongoing ACP Keyon De La Rosa notified, care is ongoing stable